# Patient Record
Sex: MALE | Race: WHITE | NOT HISPANIC OR LATINO | Employment: FULL TIME | URBAN - METROPOLITAN AREA
[De-identification: names, ages, dates, MRNs, and addresses within clinical notes are randomized per-mention and may not be internally consistent; named-entity substitution may affect disease eponyms.]

---

## 2023-04-19 DIAGNOSIS — I10 ESSENTIAL (PRIMARY) HYPERTENSION: ICD-10-CM

## 2023-04-20 RX ORDER — LOSARTAN POTASSIUM 100 MG/1
TABLET ORAL
Qty: 90 TABLET | Refills: 2 | Status: SHIPPED | OUTPATIENT
Start: 2023-04-20 | End: 2024-01-11

## 2023-05-20 DIAGNOSIS — E11.43 TYPE 2 DIABETES MELLITUS WITH DIABETIC AUTONOMIC (POLY)NEUROPATHY (MULTI): ICD-10-CM

## 2023-05-20 RX ORDER — METFORMIN HYDROCHLORIDE 500 MG/1
TABLET ORAL
Qty: 90 TABLET | Refills: 0 | Status: SHIPPED | OUTPATIENT
Start: 2023-05-20 | End: 2023-09-12 | Stop reason: SDUPTHER

## 2023-09-11 ENCOUNTER — TELEPHONE (OUTPATIENT)
Dept: PRIMARY CARE | Facility: CLINIC | Age: 53
End: 2023-09-11
Payer: COMMERCIAL

## 2023-09-11 DIAGNOSIS — E11.43 TYPE 2 DIABETES MELLITUS WITH DIABETIC AUTONOMIC (POLY)NEUROPATHY (MULTI): Primary | ICD-10-CM

## 2023-09-11 DIAGNOSIS — I10 HYPERTENSION, UNSPECIFIED TYPE: ICD-10-CM

## 2023-09-11 DIAGNOSIS — R73.09 ELEVATED GLUCOSE: ICD-10-CM

## 2023-09-11 DIAGNOSIS — Z12.5 PROSTATE CANCER SCREENING: ICD-10-CM

## 2023-09-11 DIAGNOSIS — E78.5 HYPERLIPIDEMIA, UNSPECIFIED HYPERLIPIDEMIA TYPE: ICD-10-CM

## 2023-09-11 PROBLEM — E78.00 ELEVATED SERUM CHOLESTEROL: Status: ACTIVE | Noted: 2023-09-11

## 2023-09-11 PROBLEM — M54.30 SCIATICA: Status: ACTIVE | Noted: 2023-09-11

## 2023-09-11 NOTE — TELEPHONE ENCOUNTER
Pt would like a full panel blood work order he says that you routinely check his cholesterol and sugar, and would like a call so he will know the order is in.

## 2023-09-11 NOTE — TELEPHONE ENCOUNTER
Pt needs a refill he only has one left   metFORMIN (Glucophage) 500 mg tablet 90 day supply   [id number]   Phone: [phone]

## 2023-09-12 RX ORDER — METFORMIN HYDROCHLORIDE 500 MG/1
500 TABLET ORAL DAILY
Qty: 90 TABLET | Refills: 0 | Status: SHIPPED | OUTPATIENT
Start: 2023-09-12 | End: 2023-12-11

## 2023-09-13 ENCOUNTER — LAB (OUTPATIENT)
Dept: LAB | Facility: LAB | Age: 53
End: 2023-09-13
Payer: COMMERCIAL

## 2023-09-13 DIAGNOSIS — R73.09 ELEVATED GLUCOSE: ICD-10-CM

## 2023-09-13 DIAGNOSIS — Z12.5 PROSTATE CANCER SCREENING: ICD-10-CM

## 2023-09-13 DIAGNOSIS — I10 HYPERTENSION, UNSPECIFIED TYPE: ICD-10-CM

## 2023-09-13 DIAGNOSIS — E78.5 HYPERLIPIDEMIA, UNSPECIFIED HYPERLIPIDEMIA TYPE: ICD-10-CM

## 2023-09-13 LAB
ALANINE AMINOTRANSFERASE (SGPT) (U/L) IN SER/PLAS: 32 U/L (ref 10–52)
ALBUMIN (G/DL) IN SER/PLAS: 4.6 G/DL (ref 3.4–5)
ALKALINE PHOSPHATASE (U/L) IN SER/PLAS: 46 U/L (ref 33–120)
ANION GAP IN SER/PLAS: 15 MMOL/L (ref 10–20)
ASPARTATE AMINOTRANSFERASE (SGOT) (U/L) IN SER/PLAS: 29 U/L (ref 9–39)
BASOPHILS (10*3/UL) IN BLOOD BY AUTOMATED COUNT: 0.06 X10E9/L (ref 0–0.1)
BASOPHILS/100 LEUKOCYTES IN BLOOD BY AUTOMATED COUNT: 0.9 % (ref 0–2)
BILIRUBIN TOTAL (MG/DL) IN SER/PLAS: 0.7 MG/DL (ref 0–1.2)
CALCIUM (MG/DL) IN SER/PLAS: 9.8 MG/DL (ref 8.6–10.6)
CARBON DIOXIDE, TOTAL (MMOL/L) IN SER/PLAS: 24 MMOL/L (ref 21–32)
CHLORIDE (MMOL/L) IN SER/PLAS: 104 MMOL/L (ref 98–107)
CHOLESTEROL (MG/DL) IN SER/PLAS: 208 MG/DL (ref 0–199)
CHOLESTEROL IN HDL (MG/DL) IN SER/PLAS: 40.5 MG/DL
CHOLESTEROL/HDL RATIO: 5.1
CREATININE (MG/DL) IN SER/PLAS: 1.08 MG/DL (ref 0.5–1.3)
EOSINOPHILS (10*3/UL) IN BLOOD BY AUTOMATED COUNT: 0.25 X10E9/L (ref 0–0.7)
EOSINOPHILS/100 LEUKOCYTES IN BLOOD BY AUTOMATED COUNT: 3.8 % (ref 0–6)
ERYTHROCYTE DISTRIBUTION WIDTH (RATIO) BY AUTOMATED COUNT: 12.9 % (ref 11.5–14.5)
ERYTHROCYTE MEAN CORPUSCULAR HEMOGLOBIN CONCENTRATION (G/DL) BY AUTOMATED: 32.5 G/DL (ref 32–36)
ERYTHROCYTE MEAN CORPUSCULAR VOLUME (FL) BY AUTOMATED COUNT: 95 FL (ref 80–100)
ERYTHROCYTES (10*6/UL) IN BLOOD BY AUTOMATED COUNT: 5.09 X10E12/L (ref 4.5–5.9)
ESTIMATED AVERAGE GLUCOSE FOR HBA1C: 137 MG/DL
GFR MALE: 83 ML/MIN/1.73M2
GLUCOSE (MG/DL) IN SER/PLAS: 110 MG/DL (ref 74–99)
HEMATOCRIT (%) IN BLOOD BY AUTOMATED COUNT: 48.6 % (ref 41–52)
HEMOGLOBIN (G/DL) IN BLOOD: 15.8 G/DL (ref 13.5–17.5)
HEMOGLOBIN A1C/HEMOGLOBIN TOTAL IN BLOOD: 6.4 %
IMMATURE GRANULOCYTES/100 LEUKOCYTES IN BLOOD BY AUTOMATED COUNT: 0.5 % (ref 0–0.9)
LDL: 133 MG/DL (ref 0–99)
LEUKOCYTES (10*3/UL) IN BLOOD BY AUTOMATED COUNT: 6.5 X10E9/L (ref 4.4–11.3)
LYMPHOCYTES (10*3/UL) IN BLOOD BY AUTOMATED COUNT: 1.75 X10E9/L (ref 1.2–4.8)
LYMPHOCYTES/100 LEUKOCYTES IN BLOOD BY AUTOMATED COUNT: 26.8 % (ref 13–44)
MONOCYTES (10*3/UL) IN BLOOD BY AUTOMATED COUNT: 0.83 X10E9/L (ref 0.1–1)
MONOCYTES/100 LEUKOCYTES IN BLOOD BY AUTOMATED COUNT: 12.7 % (ref 2–10)
NEUTROPHILS (10*3/UL) IN BLOOD BY AUTOMATED COUNT: 3.62 X10E9/L (ref 1.2–7.7)
NEUTROPHILS/100 LEUKOCYTES IN BLOOD BY AUTOMATED COUNT: 55.3 % (ref 40–80)
NRBC (PER 100 WBCS) BY AUTOMATED COUNT: 0 /100 WBC (ref 0–0)
PLATELETS (10*3/UL) IN BLOOD AUTOMATED COUNT: 288 X10E9/L (ref 150–450)
POTASSIUM (MMOL/L) IN SER/PLAS: 4.3 MMOL/L (ref 3.5–5.3)
PROSTATE SPECIFIC AG (NG/ML) IN SER/PLAS: 0.56 NG/ML (ref 0–4)
PROTEIN TOTAL: 6.9 G/DL (ref 6.4–8.2)
SODIUM (MMOL/L) IN SER/PLAS: 139 MMOL/L (ref 136–145)
THYROTROPIN (MIU/L) IN SER/PLAS BY DETECTION LIMIT <= 0.05 MIU/L: 1.14 MIU/L (ref 0.44–3.98)
TRIGLYCERIDE (MG/DL) IN SER/PLAS: 172 MG/DL (ref 0–149)
UREA NITROGEN (MG/DL) IN SER/PLAS: 16 MG/DL (ref 6–23)
VLDL: 34 MG/DL (ref 0–40)

## 2023-09-13 PROCEDURE — 84443 ASSAY THYROID STIM HORMONE: CPT

## 2023-09-13 PROCEDURE — 80061 LIPID PANEL: CPT

## 2023-09-13 PROCEDURE — 80053 COMPREHEN METABOLIC PANEL: CPT

## 2023-09-13 PROCEDURE — 36415 COLL VENOUS BLD VENIPUNCTURE: CPT

## 2023-09-13 PROCEDURE — 83036 HEMOGLOBIN GLYCOSYLATED A1C: CPT

## 2023-09-13 PROCEDURE — 84153 ASSAY OF PSA TOTAL: CPT

## 2023-09-13 PROCEDURE — 85025 COMPLETE CBC W/AUTO DIFF WBC: CPT

## 2023-11-20 ENCOUNTER — OFFICE VISIT (OUTPATIENT)
Dept: PRIMARY CARE | Facility: CLINIC | Age: 53
End: 2023-11-20
Payer: COMMERCIAL

## 2023-11-20 VITALS
BODY MASS INDEX: 27.92 KG/M2 | SYSTOLIC BLOOD PRESSURE: 114 MMHG | HEART RATE: 70 BPM | OXYGEN SATURATION: 97 % | TEMPERATURE: 97.5 F | HEIGHT: 70 IN | DIASTOLIC BLOOD PRESSURE: 80 MMHG | WEIGHT: 195 LBS

## 2023-11-20 DIAGNOSIS — Z00.00 ENCOUNTER FOR ROUTINE HISTORY AND PHYSICAL EXAM FOR MALE: Primary | ICD-10-CM

## 2023-11-20 DIAGNOSIS — Z23 NEED FOR VACCINATION: ICD-10-CM

## 2023-11-20 DIAGNOSIS — E78.5 HYPERLIPIDEMIA, UNSPECIFIED HYPERLIPIDEMIA TYPE: ICD-10-CM

## 2023-11-20 DIAGNOSIS — E11.9 TYPE 2 DIABETES MELLITUS WITHOUT COMPLICATION, WITHOUT LONG-TERM CURRENT USE OF INSULIN (MULTI): ICD-10-CM

## 2023-11-20 DIAGNOSIS — E78.00 ELEVATED SERUM CHOLESTEROL: ICD-10-CM

## 2023-11-20 DIAGNOSIS — I10 PRIMARY HYPERTENSION: ICD-10-CM

## 2023-11-20 DIAGNOSIS — Z12.11 COLON CANCER SCREENING: ICD-10-CM

## 2023-11-20 PROBLEM — L03.213 PERIORBITAL CELLULITIS: Status: RESOLVED | Noted: 2023-11-20 | Resolved: 2023-11-20

## 2023-11-20 PROBLEM — L03.213 PERIORBITAL CELLULITIS: Status: ACTIVE | Noted: 2023-11-20

## 2023-11-20 PROBLEM — N48.9 PENILE LESION: Status: RESOLVED | Noted: 2023-11-20 | Resolved: 2023-11-20

## 2023-11-20 PROBLEM — M54.30 SCIATICA: Status: RESOLVED | Noted: 2023-09-11 | Resolved: 2023-11-20

## 2023-11-20 PROBLEM — N48.9 PENILE LESION: Status: ACTIVE | Noted: 2023-11-20

## 2023-11-20 PROCEDURE — 3079F DIAST BP 80-89 MM HG: CPT | Performed by: FAMILY MEDICINE

## 2023-11-20 PROCEDURE — 3044F HG A1C LEVEL LT 7.0%: CPT | Performed by: FAMILY MEDICINE

## 2023-11-20 PROCEDURE — 93000 ELECTROCARDIOGRAM COMPLETE: CPT | Performed by: FAMILY MEDICINE

## 2023-11-20 PROCEDURE — 1036F TOBACCO NON-USER: CPT | Performed by: FAMILY MEDICINE

## 2023-11-20 PROCEDURE — 3074F SYST BP LT 130 MM HG: CPT | Performed by: FAMILY MEDICINE

## 2023-11-20 PROCEDURE — 99396 PREV VISIT EST AGE 40-64: CPT | Performed by: FAMILY MEDICINE

## 2023-11-20 PROCEDURE — 4010F ACE/ARB THERAPY RXD/TAKEN: CPT | Performed by: FAMILY MEDICINE

## 2023-11-20 RX ORDER — PRAVASTATIN SODIUM 10 MG/1
10 TABLET ORAL DAILY
Qty: 30 TABLET | Refills: 5 | Status: SHIPPED | OUTPATIENT
Start: 2023-11-20 | End: 2024-04-05 | Stop reason: ALTCHOICE

## 2023-11-20 ASSESSMENT — ENCOUNTER SYMPTOMS
APPETITE CHANGE: 0
NUMBNESS: 0
OCCASIONAL FEELINGS OF UNSTEADINESS: 0
FACIAL ASYMMETRY: 0
EYE ITCHING: 0
DIZZINESS: 0
DYSPHORIC MOOD: 0
LIGHT-HEADEDNESS: 0
TROUBLE SWALLOWING: 0
DIARRHEA: 0
ADENOPATHY: 0
PALPITATIONS: 0
DIFFICULTY URINATING: 0
EYES NEGATIVE: 1
VOMITING: 0
DEPRESSION: 0
ABDOMINAL DISTENTION: 0
HEMATURIA: 0
HALLUCINATIONS: 0
RESPIRATORY NEGATIVE: 1
SINUS PAIN: 0
CONSTIPATION: 0
VOICE CHANGE: 0
FATIGUE: 0
CHEST TIGHTNESS: 0
MYALGIAS: 0
FREQUENCY: 0
ANAL BLEEDING: 0
ABDOMINAL PAIN: 0
GASTROINTESTINAL NEGATIVE: 1
SPEECH DIFFICULTY: 0
FLANK PAIN: 0
DECREASED CONCENTRATION: 0
POLYDIPSIA: 0
ARTHRALGIAS: 0
DYSURIA: 0
AGITATION: 0
BRUISES/BLEEDS EASILY: 0
DIAPHORESIS: 0
CARDIOVASCULAR NEGATIVE: 1
SEIZURES: 0
NAUSEA: 0
BLOOD IN STOOL: 0
EYE DISCHARGE: 0
BACK PAIN: 0
NERVOUS/ANXIOUS: 0
SINUS PRESSURE: 0
WOUND: 0
SORE THROAT: 0
LOSS OF SENSATION IN FEET: 0
CHILLS: 0
ACTIVITY CHANGE: 0
SLEEP DISTURBANCE: 0
COUGH: 0
UNEXPECTED WEIGHT CHANGE: 0
EYE PAIN: 0
EYE REDNESS: 0
HEADACHES: 0

## 2023-11-20 ASSESSMENT — COLUMBIA-SUICIDE SEVERITY RATING SCALE - C-SSRS
1. IN THE PAST MONTH, HAVE YOU WISHED YOU WERE DEAD OR WISHED YOU COULD GO TO SLEEP AND NOT WAKE UP?: NO
6. HAVE YOU EVER DONE ANYTHING, STARTED TO DO ANYTHING, OR PREPARED TO DO ANYTHING TO END YOUR LIFE?: NO
2. HAVE YOU ACTUALLY HAD ANY THOUGHTS OF KILLING YOURSELF?: NO

## 2023-11-20 ASSESSMENT — PATIENT HEALTH QUESTIONNAIRE - PHQ9
2. FEELING DOWN, DEPRESSED OR HOPELESS: NOT AT ALL
1. LITTLE INTEREST OR PLEASURE IN DOING THINGS: NOT AT ALL
SUM OF ALL RESPONSES TO PHQ9 QUESTIONS 1 AND 2: 0
10. IF YOU CHECKED OFF ANY PROBLEMS, HOW DIFFICULT HAVE THESE PROBLEMS MADE IT FOR YOU TO DO YOUR WORK, TAKE CARE OF THINGS AT HOME, OR GET ALONG WITH OTHER PEOPLE: NOT DIFFICULT AT ALL

## 2023-11-20 NOTE — PROGRESS NOTES
Subjective   Patient ID: Sherwin Saldana is a 53 y.o. male who presents for Annual Exam.    Patient overall doing well.  He has had no troubles with headache no double vision or blurry vision.    Patient needs to have his colon cancer screening performed.    Patient had taken rosuvastatin in the past and had muscle aches and discomfort with it.    Discussion about starting a new medicine we are going to try this.    Patient had no troubles with headache or double vision or blurring vision no troubles with sore throat or difficulty swallowing no troubles with abdominal pain or discomfort no troubles with swelling of the legs or feet.  No fever no chills no night sweats.         Alcohol intake: none  Caffeine intake: 2 cups a day  Exercise: hiking     Last Colonoscopy: ordered  Last Pap smear: N/A  Mammogram:N/A  Last Dexa scan:N/A    Shingles vaccine: recommended  TdaP vaccine:     Review of Systems   Constitutional:  Negative for activity change, appetite change, chills, diaphoresis, fatigue and unexpected weight change.   HENT: Negative.  Negative for congestion, dental problem, ear discharge, ear pain, hearing loss, mouth sores, nosebleeds, postnasal drip, sinus pressure, sinus pain, sore throat, tinnitus, trouble swallowing and voice change.    Eyes: Negative.  Negative for pain, discharge, redness, itching and visual disturbance.   Respiratory: Negative.  Negative for cough and chest tightness.    Cardiovascular: Negative.  Negative for chest pain, palpitations and leg swelling.   Gastrointestinal: Negative.  Negative for abdominal distention, abdominal pain, anal bleeding, blood in stool, constipation, diarrhea, nausea and vomiting.   Endocrine: Negative for cold intolerance, heat intolerance, polydipsia and polyuria.   Genitourinary:  Negative for difficulty urinating, dysuria, enuresis, flank pain, frequency, genital sores and hematuria.   Musculoskeletal:  Negative for arthralgias, back pain and myalgias.  "  Skin: Negative.  Negative for rash and wound.   Allergic/Immunologic: Negative for environmental allergies, food allergies and immunocompromised state.   Neurological:  Negative for dizziness, seizures, facial asymmetry, speech difficulty, light-headedness, numbness and headaches.   Hematological:  Negative for adenopathy. Does not bruise/bleed easily.   Psychiatric/Behavioral:  Negative for agitation, behavioral problems, decreased concentration, dysphoric mood, hallucinations, sleep disturbance and suicidal ideas. The patient is not nervous/anxious.        Objective   /80   Pulse 70   Temp 36.4 °C (97.5 °F)   Ht 1.778 m (5' 10\")   Wt 88.5 kg (195 lb)   SpO2 97%   BMI 27.98 kg/m²   BSA Body surface area is 2.09 meters squared.      Physical Exam  Constitutional:       General: He is not in acute distress.     Appearance: Normal appearance. He is not ill-appearing or toxic-appearing.   HENT:      Head: Normocephalic.      Right Ear: Tympanic membrane normal.      Left Ear: Tympanic membrane normal.      Nose: Nose normal.   Eyes:      Extraocular Movements: Extraocular movements intact.      Conjunctiva/sclera: Conjunctivae normal.      Pupils: Pupils are equal, round, and reactive to light.   Cardiovascular:      Rate and Rhythm: Normal rate and regular rhythm.      Pulses: Normal pulses.      Heart sounds: Normal heart sounds.   Pulmonary:      Effort: Pulmonary effort is normal.      Breath sounds: Normal breath sounds. No wheezing or rhonchi.   Abdominal:      General: Abdomen is flat. Bowel sounds are normal. There is no distension.      Palpations: Abdomen is soft.      Tenderness: There is no abdominal tenderness. There is no right CVA tenderness or rebound.      Hernia: No hernia is present.   Genitourinary:     Penis: Normal.       Testes: Normal.      Prostate: Normal.      Rectum: Normal.   Musculoskeletal:         General: Normal range of motion.      Cervical back: Normal range of motion. "      Right lower leg: No edema.   Skin:     General: Skin is warm and dry.      Capillary Refill: Capillary refill takes less than 2 seconds.      Findings: No bruising.   Neurological:      General: No focal deficit present.      Mental Status: He is alert and oriented to person, place, and time.      Cranial Nerves: No cranial nerve deficit.      Motor: No weakness.      Coordination: Coordination normal.      Gait: Gait normal.      Deep Tendon Reflexes: Reflexes normal.   Psychiatric:         Mood and Affect: Mood normal.         Behavior: Behavior normal.         Thought Content: Thought content normal.     Feet revealed no calluses.    Monofilament testing is intact.  Lab on 09/13/2023   Component Date Value Ref Range Status    WBC 09/13/2023 6.5  4.4 - 11.3 x10E9/L Final    nRBC 09/13/2023 0.0  0.0 - 0.0 /100 WBC Final    RBC 09/13/2023 5.09  4.50 - 5.90 x10E12/L Final    Hemoglobin 09/13/2023 15.8  13.5 - 17.5 g/dL Final    Hematocrit 09/13/2023 48.6  41.0 - 52.0 % Final    MCV 09/13/2023 95  80 - 100 fL Final    MCHC 09/13/2023 32.5  32.0 - 36.0 g/dL Final    Platelets 09/13/2023 288  150 - 450 x10E9/L Final    RDW 09/13/2023 12.9  11.5 - 14.5 % Final    Neutrophils % 09/13/2023 55.3  40.0 - 80.0 % Final    Immature Granulocytes %, Automated 09/13/2023 0.5  0.0 - 0.9 % Final     Immature Granulocyte Count (IG) includes promyelocytes,    myelocytes and metamyelocytes but does not include bands.   Percent differential counts (%) should be interpreted in the   context of the absolute cell counts (cells/L).    Lymphocytes % 09/13/2023 26.8  13.0 - 44.0 % Final    Monocytes % 09/13/2023 12.7  2.0 - 10.0 % Final    Eosinophils % 09/13/2023 3.8  0.0 - 6.0 % Final    Basophils % 09/13/2023 0.9  0.0 - 2.0 % Final    Neutrophils Absolute 09/13/2023 3.62  1.20 - 7.70 x10E9/L Final    Lymphocytes Absolute 09/13/2023 1.75  1.20 - 4.80 x10E9/L Final    Monocytes Absolute 09/13/2023 0.83  0.10 - 1.00 x10E9/L Final     Eosinophils Absolute 09/13/2023 0.25  0.00 - 0.70 x10E9/L Final    Basophils Absolute 09/13/2023 0.06  0.00 - 0.10 x10E9/L Final    Glucose 09/13/2023 110 (H)  74 - 99 mg/dL Final    Sodium 09/13/2023 139  136 - 145 mmol/L Final    Potassium 09/13/2023 4.3  3.5 - 5.3 mmol/L Final    Chloride 09/13/2023 104  98 - 107 mmol/L Final    Bicarbonate 09/13/2023 24  21 - 32 mmol/L Final    Anion Gap 09/13/2023 15  10 - 20 mmol/L Final    Urea Nitrogen 09/13/2023 16  6 - 23 mg/dL Final    Creatinine 09/13/2023 1.08  0.50 - 1.30 mg/dL Final    GFR MALE 09/13/2023 83  >90 mL/min/1.73m2 Final     CALCULATIONS OF ESTIMATED GFR ARE PERFORMED   USING THE 2021 CKD-EPI STUDY REFIT EQUATION   WITHOUT THE RACE VARIABLE FOR THE IDMS-TRACEABLE   CREATININE METHODS.    https://jasn.asnjournals.org/content/early/2021/09/22/ASN.4341154054    Calcium 09/13/2023 9.8  8.6 - 10.6 mg/dL Final    Albumin 09/13/2023 4.6  3.4 - 5.0 g/dL Final    Alkaline Phosphatase 09/13/2023 46  33 - 120 U/L Final    Total Protein 09/13/2023 6.9  6.4 - 8.2 g/dL Final    AST 09/13/2023 29  9 - 39 U/L Final    Total Bilirubin 09/13/2023 0.7  0.0 - 1.2 mg/dL Final    ALT (SGPT) 09/13/2023 32  10 - 52 U/L Final     Patients treated with Sulfasalazine may generate    falsely decreased results for ALT.    TSH 09/13/2023 1.14  0.44 - 3.98 mIU/L Final     TSH testing is performed using different testing    methodology at [facility] than at other    [facility] hospitals. Direct result comparisons should    only be made within the same method.    Cholesterol 09/13/2023 208 (H)  0 - 199 mg/dL Final    .      AGE      DESIRABLE   BORDERLINE HIGH   HIGH     0-19 Y     0 - 169       170 - 199     >/= 200    20-24 Y     0 - 189       190 - 224     >/= 225         >24 Y     0 - 199       200 - 239     >/= 240   **All ranges are based on fasting samples. Specific   therapeutic targets will vary based on patient-specific   cardiac risk.  .   Pediatric guidelines  reference:Pediatrics 2011, 128(S5).   Adult guidelines reference: NCEP ATPIII Guidelines,     KURT 2001, 258:2486-04  .   Venipuncture immediately after or during the    administration of Metamizole may lead to falsely   low results. Testing should be performed immediately   prior to Metamizole dosing.    HDL 09/13/2023 40.5  mg/dL Final    .      AGE      VERY LOW   LOW     NORMAL    HIGH       0-19 Y       < 35   < 40     40-45     ----    20-24 Y       ----   < 40       >45     ----      >24 Y       ----   < 40     40-60      >60  .    Cholesterol/HDL Ratio 09/13/2023 5.1 (A)   Final    REF VALUES  DESIRABLE  < 3.4  HIGH RISK  > 5.0    LDL 09/13/2023 133 (H)  0 - 99 mg/dL Final    .                           NEAR      BORD      AGE      DESIRABLE  OPTIMAL    HIGH     HIGH     VERY HIGH     0-19 Y     0 - 109     ---    110-129   >/= 130     ----    20-24 Y     0 - 119     ---    120-159   >/= 160     ----      >24 Y     0 -  99   100-129  130-159   160-189     >/=190  .    VLDL 09/13/2023 34  0 - 40 mg/dL Final    Triglycerides 09/13/2023 172 (H)  0 - 149 mg/dL Final    .      AGE      DESIRABLE   BORDERLINE HIGH   HIGH     VERY HIGH   0 D-90 D    19 - 174         ----         ----        ----  91 D- 9 Y     0 -  74        75 -  99     >/= 100      ----    10-19 Y     0 -  89        90 - 129     >/= 130      ----    20-24 Y     0 - 114       115 - 149     >/= 150      ----         >24 Y     0 - 149       150 - 199    200- 499    >/= 500  .   Venipuncture immediately after or during the    administration of Metamizole may lead to falsely   low results. Testing should be performed immediately   prior to Metamizole dosing.    PSA 09/13/2023 0.56  0.00 - 4.00 ng/mL Final    The FDA requires that the method used for PSA assay be   reported to the physician. Values obtained with different   assay methods must not be used interchangeably. This test   was performed at [facility] using the  [employer] PSA method, which is a sandwich immunoassay using   chemiluminescence for quantitation. The assay is approved  for measurement of prostate-specific antigen (PSA) in   serum and may be used in conjunction with a digital rectal  examination in men 50 years and older as an aid in   detection of prostate cancer.   5-Alpha-reductase inhibitors (e.g. Proscar, Finasteride,   Avodart, Dutasteride and Gaviota) for the treatment of BPH   have been shown to lower PSA levels by an average of 50%   after 6 months of treatment.    Hemoglobin A1C 09/13/2023 6.4 (A)  % Final         Diagnosis of Diabetes-Adults   Non-Diabetic: < or = 5.6%   Increased risk for developing diabetes: 5.7-6.4%   Diagnostic of diabetes: > or = 6.5%  .       Monitoring of Diabetes                Age (y)     Therapeutic Goal (%)   Adults:          >18           <7.0   Pediatrics:    13-18           <7.5                   7-12           <8.0                   0- 6            7.5-8.5   American Diabetes Association. Diabetes Care 33(S1), Jan 2010.    Estimated Average Glucose 09/13/2023 137  MG/DL Final     Current Outpatient Medications on File Prior to Visit   Medication Sig Dispense Refill    losartan (Cozaar) 100 mg tablet TAKE 1 TABLET BY MOUTH EVERY DAY 90 tablet 2    metFORMIN (Glucophage) 500 mg tablet Take 1 tablet (500 mg) by mouth once daily. 90 tablet 0     No current facility-administered medications on file prior to visit.     No images are attached to the encounter.            Assessment/Plan   Problem List Items Addressed This Visit             ICD-10-CM    Elevated serum cholesterol E78.00     Cholesterol level is elevated.  Going to have pravastatin started.    Please take 1 daily.         Hyperlipemia E78.5     Cholesterol level not at goal.         Relevant Medications    pravastatin (Pravachol) 10 mg tablet    Hypertension I10    Encounter for routine history and physical exam for male - Primary Z00.00    Need for  vaccination Z23    Relevant Orders    Flu vaccine (IIV4) age 6 months and greater, preservative free    Type 2 diabetes mellitus without complication, without long-term current use of insulin (CMS/[facility]) E11.9     Hemoglobin A1c is at goal.  On metformin

## 2023-11-20 NOTE — PATIENT INSTRUCTIONS
Please continue to exercise 40 minutes 4 times weekly.    Please continue to follow dietary guidelines closely.    Trying Pravachol therapy to try to help lower the cholesterol levels.  If muscle aches or discomfort noted please let me know.    EKG performed and reviewed.    Urinalysis and micral are being performed.    Recommend having colon cancer screening performed.  If troubles making appointment, please call and let me know    We are checking lipids in 4 weeks

## 2023-12-09 DIAGNOSIS — E11.43 TYPE 2 DIABETES MELLITUS WITH DIABETIC AUTONOMIC (POLY)NEUROPATHY (MULTI): ICD-10-CM

## 2023-12-11 RX ORDER — METFORMIN HYDROCHLORIDE 500 MG/1
500 TABLET ORAL DAILY
Qty: 90 TABLET | Refills: 1 | Status: SHIPPED | OUTPATIENT
Start: 2023-12-11 | End: 2024-06-04

## 2023-12-15 ENCOUNTER — LAB (OUTPATIENT)
Dept: LAB | Facility: LAB | Age: 53
End: 2023-12-15
Payer: COMMERCIAL

## 2023-12-15 DIAGNOSIS — E11.9 TYPE 2 DIABETES MELLITUS WITHOUT COMPLICATION, WITHOUT LONG-TERM CURRENT USE OF INSULIN (MULTI): ICD-10-CM

## 2023-12-15 DIAGNOSIS — E78.00 ELEVATED SERUM CHOLESTEROL: ICD-10-CM

## 2023-12-15 DIAGNOSIS — E78.5 HYPERLIPIDEMIA, UNSPECIFIED HYPERLIPIDEMIA TYPE: ICD-10-CM

## 2023-12-15 DIAGNOSIS — I10 PRIMARY HYPERTENSION: ICD-10-CM

## 2023-12-15 LAB
ALBUMIN SERPL BCP-MCNC: 4.9 G/DL (ref 3.4–5)
ALP SERPL-CCNC: 43 U/L (ref 33–120)
ALT SERPL W P-5'-P-CCNC: 27 U/L (ref 10–52)
ANION GAP SERPL CALC-SCNC: 15 MMOL/L (ref 10–20)
APPEARANCE UR: CLEAR
AST SERPL W P-5'-P-CCNC: 30 U/L (ref 9–39)
BILIRUB SERPL-MCNC: 0.7 MG/DL (ref 0–1.2)
BILIRUB UR STRIP.AUTO-MCNC: NEGATIVE MG/DL
BUN SERPL-MCNC: 14 MG/DL (ref 6–23)
CALCIUM SERPL-MCNC: 10.2 MG/DL (ref 8.6–10.6)
CHLORIDE SERPL-SCNC: 103 MMOL/L (ref 98–107)
CHOLEST SERPL-MCNC: 196 MG/DL (ref 0–199)
CHOLESTEROL/HDL RATIO: 4.2
CK SERPL-CCNC: 31 U/L (ref 0–325)
CO2 SERPL-SCNC: 28 MMOL/L (ref 21–32)
COLOR UR: YELLOW
CREAT SERPL-MCNC: 1.02 MG/DL (ref 0.5–1.3)
CREAT UR-MCNC: 122.2 MG/DL (ref 20–370)
CRP SERPL-MCNC: 0.2 MG/DL
ERYTHROCYTE [SEDIMENTATION RATE] IN BLOOD BY WESTERGREN METHOD: 2 MM/H (ref 0–20)
GFR SERPL CREATININE-BSD FRML MDRD: 89 ML/MIN/1.73M*2
GLUCOSE SERPL-MCNC: 107 MG/DL (ref 74–99)
GLUCOSE UR STRIP.AUTO-MCNC: NEGATIVE MG/DL
HDLC SERPL-MCNC: 46.3 MG/DL
KETONES UR STRIP.AUTO-MCNC: NEGATIVE MG/DL
LDLC SERPL CALC-MCNC: 125 MG/DL
LEUKOCYTE ESTERASE UR QL STRIP.AUTO: NEGATIVE
MICROALBUMIN UR-MCNC: 7.1 MG/L
MICROALBUMIN/CREAT UR: 5.8 UG/MG CREAT
NITRITE UR QL STRIP.AUTO: NEGATIVE
NON HDL CHOLESTEROL: 150 MG/DL (ref 0–149)
PH UR STRIP.AUTO: 5 [PH]
POTASSIUM SERPL-SCNC: 4.7 MMOL/L (ref 3.5–5.3)
PROT SERPL-MCNC: 7.4 G/DL (ref 6.4–8.2)
PROT UR STRIP.AUTO-MCNC: NEGATIVE MG/DL
RBC # UR STRIP.AUTO: NEGATIVE /UL
SODIUM SERPL-SCNC: 141 MMOL/L (ref 136–145)
SP GR UR STRIP.AUTO: 1.01
TRIGL SERPL-MCNC: 123 MG/DL (ref 0–149)
UROBILINOGEN UR STRIP.AUTO-MCNC: <2 MG/DL
VLDL: 25 MG/DL (ref 0–40)

## 2023-12-15 PROCEDURE — 80053 COMPREHEN METABOLIC PANEL: CPT

## 2023-12-15 PROCEDURE — 36415 COLL VENOUS BLD VENIPUNCTURE: CPT

## 2023-12-15 PROCEDURE — 85652 RBC SED RATE AUTOMATED: CPT

## 2023-12-15 PROCEDURE — 81003 URINALYSIS AUTO W/O SCOPE: CPT

## 2023-12-15 PROCEDURE — 82570 ASSAY OF URINE CREATININE: CPT

## 2023-12-15 PROCEDURE — 82043 UR ALBUMIN QUANTITATIVE: CPT

## 2023-12-15 PROCEDURE — 86140 C-REACTIVE PROTEIN: CPT

## 2023-12-15 PROCEDURE — 82550 ASSAY OF CK (CPK): CPT

## 2023-12-15 PROCEDURE — 80061 LIPID PANEL: CPT

## 2024-01-11 DIAGNOSIS — I10 ESSENTIAL (PRIMARY) HYPERTENSION: ICD-10-CM

## 2024-01-11 RX ORDER — LOSARTAN POTASSIUM 100 MG/1
TABLET ORAL
Qty: 90 TABLET | Refills: 2 | Status: SHIPPED | OUTPATIENT
Start: 2024-01-11

## 2024-02-15 ENCOUNTER — OFFICE VISIT (OUTPATIENT)
Dept: PRIMARY CARE | Facility: CLINIC | Age: 54
End: 2024-02-15
Payer: COMMERCIAL

## 2024-02-15 ENCOUNTER — LAB (OUTPATIENT)
Dept: LAB | Facility: LAB | Age: 54
End: 2024-02-15
Payer: COMMERCIAL

## 2024-02-15 VITALS
HEART RATE: 103 BPM | WEIGHT: 197 LBS | RESPIRATION RATE: 16 BRPM | SYSTOLIC BLOOD PRESSURE: 127 MMHG | TEMPERATURE: 97.6 F | OXYGEN SATURATION: 98 % | DIASTOLIC BLOOD PRESSURE: 90 MMHG | BODY MASS INDEX: 28.27 KG/M2

## 2024-02-15 DIAGNOSIS — G50.0 TRIGEMINAL NEURALGIA: ICD-10-CM

## 2024-02-15 DIAGNOSIS — E11.9 TYPE 2 DIABETES MELLITUS WITHOUT COMPLICATION, WITHOUT LONG-TERM CURRENT USE OF INSULIN (MULTI): ICD-10-CM

## 2024-02-15 DIAGNOSIS — G50.0 TRIGEMINAL NEURALGIA: Primary | ICD-10-CM

## 2024-02-15 LAB
ALBUMIN SERPL BCP-MCNC: 4.8 G/DL (ref 3.4–5)
ALP SERPL-CCNC: 46 U/L (ref 33–120)
ALT SERPL W P-5'-P-CCNC: 30 U/L (ref 10–52)
ANION GAP SERPL CALC-SCNC: 18 MMOL/L (ref 10–20)
AST SERPL W P-5'-P-CCNC: 26 U/L (ref 9–39)
BILIRUB SERPL-MCNC: 0.5 MG/DL (ref 0–1.2)
BUN SERPL-MCNC: 12 MG/DL (ref 6–23)
CALCIUM SERPL-MCNC: 10.2 MG/DL (ref 8.6–10.6)
CHLORIDE SERPL-SCNC: 102 MMOL/L (ref 98–107)
CHOLEST SERPL-MCNC: 261 MG/DL (ref 0–199)
CHOLESTEROL/HDL RATIO: 5.5
CO2 SERPL-SCNC: 27 MMOL/L (ref 21–32)
CREAT SERPL-MCNC: 0.9 MG/DL (ref 0.5–1.3)
CRP SERPL-MCNC: <0.1 MG/DL
EGFRCR SERPLBLD CKD-EPI 2021: >90 ML/MIN/1.73M*2
ERYTHROCYTE [SEDIMENTATION RATE] IN BLOOD BY WESTERGREN METHOD: 3 MM/H (ref 0–20)
EST. AVERAGE GLUCOSE BLD GHB EST-MCNC: 143 MG/DL
GLUCOSE SERPL-MCNC: 139 MG/DL (ref 74–99)
HBA1C MFR BLD: 6.6 %
HDLC SERPL-MCNC: 47.3 MG/DL
LDLC SERPL CALC-MCNC: 177 MG/DL
NON HDL CHOLESTEROL: 214 MG/DL (ref 0–149)
POTASSIUM SERPL-SCNC: 4.6 MMOL/L (ref 3.5–5.3)
PROT SERPL-MCNC: 7.1 G/DL (ref 6.4–8.2)
SODIUM SERPL-SCNC: 142 MMOL/L (ref 136–145)
TRIGL SERPL-MCNC: 184 MG/DL (ref 0–149)
VLDL: 37 MG/DL (ref 0–40)

## 2024-02-15 PROCEDURE — 1036F TOBACCO NON-USER: CPT | Performed by: FAMILY MEDICINE

## 2024-02-15 PROCEDURE — 83036 HEMOGLOBIN GLYCOSYLATED A1C: CPT

## 2024-02-15 PROCEDURE — 4010F ACE/ARB THERAPY RXD/TAKEN: CPT | Performed by: FAMILY MEDICINE

## 2024-02-15 PROCEDURE — 3080F DIAST BP >= 90 MM HG: CPT | Performed by: FAMILY MEDICINE

## 2024-02-15 PROCEDURE — 80053 COMPREHEN METABOLIC PANEL: CPT

## 2024-02-15 PROCEDURE — 3074F SYST BP LT 130 MM HG: CPT | Performed by: FAMILY MEDICINE

## 2024-02-15 PROCEDURE — 36415 COLL VENOUS BLD VENIPUNCTURE: CPT

## 2024-02-15 PROCEDURE — 86140 C-REACTIVE PROTEIN: CPT

## 2024-02-15 PROCEDURE — 80061 LIPID PANEL: CPT

## 2024-02-15 PROCEDURE — 99213 OFFICE O/P EST LOW 20 MIN: CPT | Performed by: FAMILY MEDICINE

## 2024-02-15 PROCEDURE — 85652 RBC SED RATE AUTOMATED: CPT

## 2024-02-15 RX ORDER — GABAPENTIN 100 MG/1
100 CAPSULE ORAL 3 TIMES DAILY
Qty: 90 CAPSULE | Refills: 0 | Status: SHIPPED | OUTPATIENT
Start: 2024-02-15 | End: 2024-04-05 | Stop reason: ALTCHOICE

## 2024-02-15 ASSESSMENT — ENCOUNTER SYMPTOMS
CARDIOVASCULAR NEGATIVE: 1
FACIAL ASYMMETRY: 0
RESPIRATORY NEGATIVE: 1
HEADACHES: 0
LOSS OF SENSATION IN FEET: 0
NUMBNESS: 0
OCCASIONAL FEELINGS OF UNSTEADINESS: 0
DIZZINESS: 0
DEPRESSION: 0
CONSTITUTIONAL NEGATIVE: 1
PAIN: 1
SPEECH DIFFICULTY: 0

## 2024-02-15 ASSESSMENT — PATIENT HEALTH QUESTIONNAIRE - PHQ9
1. LITTLE INTEREST OR PLEASURE IN DOING THINGS: NOT AT ALL
10. IF YOU CHECKED OFF ANY PROBLEMS, HOW DIFFICULT HAVE THESE PROBLEMS MADE IT FOR YOU TO DO YOUR WORK, TAKE CARE OF THINGS AT HOME, OR GET ALONG WITH OTHER PEOPLE: NOT DIFFICULT AT ALL
2. FEELING DOWN, DEPRESSED OR HOPELESS: NOT AT ALL
SUM OF ALL RESPONSES TO PHQ9 QUESTIONS 1 AND 2: 0

## 2024-02-15 NOTE — ASSESSMENT & PLAN NOTE
The evaluating trigeminal neuralgia.    Starting Neurontin therapy.    MRI of the brain with and without contrast being performed.  Checking labs including CMP's and lipids hemoglobin A1c sed rate and CRP

## 2024-02-15 NOTE — PROGRESS NOTES
Subjective   Patient ID: Sherwin Saldana is a 53 y.o. male who presents for Pain (Nerve pain ; right hand side of face and jaw).    Clean dentist.  Sharp lancing pain.  Patient over the last couple of months has been getting sharp lancing pain in the right side of the face going into the right jaw area to the tip of the jaw.  He had no trauma he had no rash no evidence of zoster.  Is not painful with eating but sometimes he can feel it was having something that is hot or something that is cold.    Patient tried ibuprofen and Tylenol it does not make a difference.  Has had extensive workup by his dentist and also with endodontist there is no dental abnormalities have been noted.    Patient states that over the last weekend it was about a 8 out of 10 with pain.  Right now to do 0 it would just come and go it may last for hours    Doing yoga. 0 pain.    Pain  Pertinent negatives include no headaches.        Review of Systems   Constitutional: Negative.    HENT: Negative.     Respiratory: Negative.     Cardiovascular: Negative.    Neurological:  Negative for dizziness, facial asymmetry, speech difficulty, numbness and headaches.   As noted in HPI    Objective   /90 (BP Location: Right arm, Patient Position: Sitting, BP Cuff Size: Adult)   Pulse 103   Temp 36.4 °C (97.6 °F)   Resp 16   Wt 89.4 kg (197 lb)   SpO2 98%   BMI 28.27 kg/m²   BSA Body surface area is 2.1 meters squared.      Physical Exam  Constitutional:       Appearance: Normal appearance.   HENT:      Head: Normocephalic.      Right Ear: Tympanic membrane normal.      Left Ear: Tympanic membrane normal.      Nose: Nose normal.      Mouth/Throat:      Mouth: Mucous membranes are dry.   Eyes:      Extraocular Movements: Extraocular movements intact.      Pupils: Pupils are equal, round, and reactive to light.   Cardiovascular:      Rate and Rhythm: Normal rate and regular rhythm.      Pulses: Normal pulses.   Pulmonary:      Effort: Pulmonary effort  is normal.      Breath sounds: Normal breath sounds.   Abdominal:      General: Abdomen is flat.      Palpations: Abdomen is soft.   Musculoskeletal:      Cervical back: Normal range of motion.   Skin:     General: Skin is warm.   Neurological:      General: No focal deficit present.      Mental Status: He is alert and oriented to person, place, and time.     Cranial nerves II through XII are intact.    Dental exam is unremarkable.    No lesions noted on the inside of mouth there is no rash or evidence of zoster cervical spine reveals good range of motion no troubles with opening and closing the jaw  Lab on 12/15/2023   Component Date Value Ref Range Status    Glucose 12/15/2023 107 (H)  74 - 99 mg/dL Final    Sodium 12/15/2023 141  136 - 145 mmol/L Final    Potassium 12/15/2023 4.7  3.5 - 5.3 mmol/L Final    Chloride 12/15/2023 103  98 - 107 mmol/L Final    Bicarbonate 12/15/2023 28  21 - 32 mmol/L Final    Anion Gap 12/15/2023 15  10 - 20 mmol/L Final    Urea Nitrogen 12/15/2023 14  6 - 23 mg/dL Final    Creatinine 12/15/2023 1.02  0.50 - 1.30 mg/dL Final    eGFR 12/15/2023 89  >60 mL/min/1.73m*2 Final    Calculations of estimated GFR are performed using the 2021 CKD-EPI Study Refit equation without the race variable for the IDMS-Traceable creatinine methods.  https://jasn.asnjournals.org/content/early/2021/09/22/ASN.1863871005    Calcium 12/15/2023 10.2  8.6 - 10.6 mg/dL Final    Albumin 12/15/2023 4.9  3.4 - 5.0 g/dL Final    Alkaline Phosphatase 12/15/2023 43  33 - 120 U/L Final    Total Protein 12/15/2023 7.4  6.4 - 8.2 g/dL Final    AST 12/15/2023 30  9 - 39 U/L Final    Bilirubin, Total 12/15/2023 0.7  0.0 - 1.2 mg/dL Final    ALT 12/15/2023 27  10 - 52 U/L Final    Patients treated with Sulfasalazine may generate falsely decreased results for ALT.    Cholesterol 12/15/2023 196  0 - 199 mg/dL Final          Age      Desirable   Borderline High   High     0-19 Y     0 - 169       170 - 199     >/= 200     20-24 Y     0 - 189       190 - 224     >/= 225         >24 Y     0 - 199       200 - 239     >/= 240   **All ranges are based on fasting samples. Specific   therapeutic targets will vary based on patient-specific   cardiac risk.    Pediatric guidelines reference:Pediatrics 2011, 128(S5).Adult guidelines reference: NCEP ATPIII Guidelines,KURT 2001, 258:2486-97    Venipuncture immediately after or during the administration of Metamizole may lead to falsely low results. Testing should be performed immediately prior to Metamizole dosing.    HDL-Cholesterol 12/15/2023 46.3  mg/dL Final      Age       Very Low   Low     Normal    High    0-19 Y    < 35      < 40     40-45     ----  20-24 Y    ----     < 40      >45      ----        >24 Y      ----     < 40     40-60      >60      Cholesterol/HDL Ratio 12/15/2023 4.2   Final      Ref Values  Desirable  < 3.4  High Risk  > 5.0    LDL Calculated 12/15/2023 125 (H)  <=99 mg/dL Final                                Near   Borderline      AGE      Desirable  Optimal    High     High     Very High     0-19 Y     0 - 109     ---    110-129   >/= 130     ----    20-24 Y     0 - 119     ---    120-159   >/= 160     ----      >24 Y     0 -  99   100-129  130-159   160-189     >/=190      VLDL 12/15/2023 25  0 - 40 mg/dL Final    Triglycerides 12/15/2023 123  0 - 149 mg/dL Final       Age         Desirable   Borderline High   High     Very High   0 D-90 D    19 - 174         ----         ----        ----  91 D- 9 Y     0 -  74        75 -  99     >/= 100      ----    10-19 Y     0 -  89        90 - 129     >/= 130      ----    20-24 Y     0 - 114       115 - 149     >/= 150      ----         >24 Y     0 - 149       150 - 199    200- 499    >/= 500    Venipuncture immediately after or during the administration of Metamizole may lead to falsely low results. Testing should be performed immediately prior to Metamizole dosing.    Non HDL Cholesterol 12/15/2023 150 (H)  0 - 149 mg/dL Final           Age       Desirable   Borderline High   High     Very High     0-19 Y     0 - 119       120 - 144     >/= 145    >/= 160    20-24 Y     0 - 149       150 - 189     >/= 190      ----         >24 Y    30 mg/dL above LDL Cholesterol goal      Creatine Kinase 12/15/2023 31  0 - 325 U/L Final    C-Reactive Protein 12/15/2023 0.20  <1.00 mg/dL Final    Sedimentation Rate 12/15/2023 2  0 - 20 mm/h Final    Albumin, Urine Random 12/15/2023 7.1  Not established mg/L Final    Creatinine, Urine Random 12/15/2023 122.2  20.0 - 370.0 mg/dL Final    Albumin/Creatine Ratio 12/15/2023 5.8  <30.0 ug/mg Creat Final    Color, Urine 12/15/2023 Yellow  Straw, Yellow Final    Appearance, Urine 12/15/2023 Clear  Clear Final    Specific Gravity, Urine 12/15/2023 1.015  1.005 - 1.035 Final    pH, Urine 12/15/2023 5.0  5.0, 5.5, 6.0, 6.5, 7.0, 7.5, 8.0 Final    Protein, Urine 12/15/2023 NEGATIVE  NEGATIVE mg/dL Final    Glucose, Urine 12/15/2023 NEGATIVE  NEGATIVE mg/dL Final    Blood, Urine 12/15/2023 NEGATIVE  NEGATIVE Final    Ketones, Urine 12/15/2023 NEGATIVE  NEGATIVE mg/dL Final    Bilirubin, Urine 12/15/2023 NEGATIVE  NEGATIVE Final    Urobilinogen, Urine 12/15/2023 <2.0  <2.0 mg/dL Final    Nitrite, Urine 12/15/2023 NEGATIVE  NEGATIVE Final    Leukocyte Esterase, Urine 12/15/2023 NEGATIVE  NEGATIVE Final   Lab on 09/13/2023   Component Date Value Ref Range Status    WBC 09/13/2023 6.5  4.4 - 11.3 x10E9/L Final    nRBC 09/13/2023 0.0  0.0 - 0.0 /100 WBC Final    RBC 09/13/2023 5.09  4.50 - 5.90 x10E12/L Final    Hemoglobin 09/13/2023 15.8  13.5 - 17.5 g/dL Final    Hematocrit 09/13/2023 48.6  41.0 - 52.0 % Final    MCV 09/13/2023 95  80 - 100 fL Final    MCHC 09/13/2023 32.5  32.0 - 36.0 g/dL Final    Platelets 09/13/2023 288  150 - 450 x10E9/L Final    RDW 09/13/2023 12.9  11.5 - 14.5 % Final    Neutrophils % 09/13/2023 55.3  40.0 - 80.0 % Final    Immature Granulocytes %, Automated 09/13/2023 0.5  0.0 - 0.9 % Final      Immature Granulocyte Count (IG) includes promyelocytes,    myelocytes and metamyelocytes but does not include bands.   Percent differential counts (%) should be interpreted in the   context of the absolute cell counts (cells/L).    Lymphocytes % 09/13/2023 26.8  13.0 - 44.0 % Final    Monocytes % 09/13/2023 12.7  2.0 - 10.0 % Final    Eosinophils % 09/13/2023 3.8  0.0 - 6.0 % Final    Basophils % 09/13/2023 0.9  0.0 - 2.0 % Final    Neutrophils Absolute 09/13/2023 3.62  1.20 - 7.70 x10E9/L Final    Lymphocytes Absolute 09/13/2023 1.75  1.20 - 4.80 x10E9/L Final    Monocytes Absolute 09/13/2023 0.83  0.10 - 1.00 x10E9/L Final    Eosinophils Absolute 09/13/2023 0.25  0.00 - 0.70 x10E9/L Final    Basophils Absolute 09/13/2023 0.06  0.00 - 0.10 x10E9/L Final    Glucose 09/13/2023 110 (H)  74 - 99 mg/dL Final    Sodium 09/13/2023 139  136 - 145 mmol/L Final    Potassium 09/13/2023 4.3  3.5 - 5.3 mmol/L Final    Chloride 09/13/2023 104  98 - 107 mmol/L Final    Bicarbonate 09/13/2023 24  21 - 32 mmol/L Final    Anion Gap 09/13/2023 15  10 - 20 mmol/L Final    Urea Nitrogen 09/13/2023 16  6 - 23 mg/dL Final    Creatinine 09/13/2023 1.08  0.50 - 1.30 mg/dL Final    GFR MALE 09/13/2023 83  >90 mL/min/1.73m2 Final     CALCULATIONS OF ESTIMATED GFR ARE PERFORMED   USING THE 2021 CKD-EPI STUDY REFIT EQUATION   WITHOUT THE RACE VARIABLE FOR THE IDMS-TRACEABLE   CREATININE METHODS.    https://jasn.asnjournals.org/content/early/2021/09/22/ASN.6053884801    Calcium 09/13/2023 9.8  8.6 - 10.6 mg/dL Final    Albumin 09/13/2023 4.6  3.4 - 5.0 g/dL Final    Alkaline Phosphatase 09/13/2023 46  33 - 120 U/L Final    Total Protein 09/13/2023 6.9  6.4 - 8.2 g/dL Final    AST 09/13/2023 29  9 - 39 U/L Final    Total Bilirubin 09/13/2023 0.7  0.0 - 1.2 mg/dL Final    ALT (SGPT) 09/13/2023 32  10 - 52 U/L Final     Patients treated with Sulfasalazine may generate    falsely decreased results for ALT.    TSH 09/13/2023 1.14  0.44 - 3.98 mIU/L  Final     TSH testing is performed using different testing    methodology at [facility] than at other    [facility]. Direct result comparisons should    only be made within the same method.    Cholesterol 09/13/2023 208 (H)  0 - 199 mg/dL Final    .      AGE      DESIRABLE   BORDERLINE HIGH   HIGH     0-19 Y     0 - 169       170 - 199     >/= 200    20-24 Y     0 - 189       190 - 224     >/= 225         >24 Y     0 - 199       200 - 239     >/= 240   **All ranges are based on fasting samples. Specific   therapeutic targets will vary based on patient-specific   cardiac risk.  .   Pediatric guidelines reference:Pediatrics 2011, 128(S5).   Adult guidelines reference: NCEP ATPIII Guidelines,     KURT 2001, 258:2486-97  .   Venipuncture immediately after or during the    administration of Metamizole may lead to falsely   low results. Testing should be performed immediately   prior to Metamizole dosing.    HDL 09/13/2023 40.5  mg/dL Final    .      AGE      VERY LOW   LOW     NORMAL    HIGH       0-19 Y       < 35   < 40     40-45     ----    20-24 Y       ----   < 40       >45     ----      >24 Y       ----   < 40     40-60      >60  .    Cholesterol/HDL Ratio 09/13/2023 5.1 (A)   Final    REF VALUES  DESIRABLE  < 3.4  HIGH RISK  > 5.0    LDL 09/13/2023 133 (H)  0 - 99 mg/dL Final    .                           NEAR      BORD      AGE      DESIRABLE  OPTIMAL    HIGH     HIGH     VERY HIGH     0-19 Y     0 - 109     ---    110-129   >/= 130     ----    20-24 Y     0 - 119     ---    120-159   >/= 160     ----      >24 Y     0 -  99   100-129  130-159   160-189     >/=190  .    VLDL 09/13/2023 34  0 - 40 mg/dL Final    Triglycerides 09/13/2023 172 (H)  0 - 149 mg/dL Final    .      AGE      DESIRABLE   BORDERLINE HIGH   HIGH     VERY HIGH   0 D-90 D    19 - 174         ----         ----        ----  91 D- 9 Y     0 -  74        75 -  99     >/= 100      ----    10-19 Y     0 -  89        90 - 129      >/= 130      ----    20-24 Y     0 - 114       115 - 149     >/= 150      ----         >24 Y     0 - 149       150 - 199    200- 499    >/= 500  .   Venipuncture immediately after or during the    administration of Metamizole may lead to falsely   low results. Testing should be performed immediately   prior to Metamizole dosing.    PSA 09/13/2023 0.56  0.00 - 4.00 ng/mL Final    The FDA requires that the method used for PSA assay be   reported to the physician. Values obtained with different   assay methods must not be used interchangeably. This test   was performed at [facility] using the Siemens  [employer] PSA method, which is a sandwich immunoassay using   chemiluminescence for quantitation. The assay is approved  for measurement of prostate-specific antigen (PSA) in   serum and may be used in conjunction with a digital rectal  examination in men 50 years and older as an aid in   detection of prostate cancer.   5-Alpha-reductase inhibitors (e.g. Proscar, Finasteride,   Avodart, Dutasteride and Gaviota) for the treatment of BPH   have been shown to lower PSA levels by an average of 50%   after 6 months of treatment.    Hemoglobin A1C 09/13/2023 6.4 (A)  % Final         Diagnosis of Diabetes-Adults   Non-Diabetic: < or = 5.6%   Increased risk for developing diabetes: 5.7-6.4%   Diagnostic of diabetes: > or = 6.5%  .       Monitoring of Diabetes                Age (y)     Therapeutic Goal (%)   Adults:          >18           <7.0   Pediatrics:    13-18           <7.5                   7-12           <8.0                   0- 6            7.5-8.5   American Diabetes Association. Diabetes Care 33(S1), Jan 2010.    Estimated Average Glucose 09/13/2023 137  MG/DL Final     Current Outpatient Medications on File Prior to Visit   Medication Sig Dispense Refill    losartan (Cozaar) 100 mg tablet TAKE 1 TABLET BY MOUTH EVERY DAY 90 tablet 2    metFORMIN (Glucophage) 500 mg tablet TAKE 1 TABLET BY MOUTH EVERY  DAY 90 tablet 1    pravastatin (Pravachol) 10 mg tablet Take 1 tablet (10 mg) by mouth once daily. (Patient not taking: Reported on 2/15/2024) 30 tablet 5     No current facility-administered medications on file prior to visit.     No images are attached to the encounter.            Assessment/Plan   Problem List Items Addressed This Visit             ICD-10-CM    Type 2 diabetes mellitus without complication, without long-term current use of insulin (CMS/[facility]) E11.9     Lab studies are being performed.    Previous hemoglobin A1c is well-controlled         Trigeminal neuralgia - Primary G50.0     The evaluating trigeminal neuralgia.    Starting Neurontin therapy.    MRI of the brain with and without contrast being performed.  Checking labs including CMP's and lipids hemoglobin A1c sed rate and CRP         Relevant Medications    gabapentin (Neurontin) 100 mg capsule

## 2024-02-15 NOTE — PATIENT INSTRUCTIONS
Findings are consistent with trigeminal neuralgia.    Starting Neurontin therapy.  If any troubles with anxiety or depression while taking medicine please stop it and call and let me know.    MRI of the brain with and without contrast are being performed.    I would like to know how you are doing with the medicine in 1 week.

## 2024-02-19 ENCOUNTER — TELEPHONE (OUTPATIENT)
Dept: PRIMARY CARE | Facility: CLINIC | Age: 54
End: 2024-02-19
Payer: COMMERCIAL

## 2024-02-19 NOTE — TELEPHONE ENCOUNTER
Pt was seen on 2/15/24 and was prescribed gabapentin for pain. He has been taking it every 8 hours. He stated that the pain comes back 1 hour before the next pill. He would like to know either if he can take sooner than every 8 hours or increase the dose.    Please advice

## 2024-02-20 NOTE — TELEPHONE ENCOUNTER
Pt informed and will take four times a day and will see how that goes. He will call us if not improving.

## 2024-02-27 ENCOUNTER — HOSPITAL ENCOUNTER (OUTPATIENT)
Dept: RADIOLOGY | Facility: CLINIC | Age: 54
Discharge: HOME | End: 2024-02-27
Payer: COMMERCIAL

## 2024-02-27 DIAGNOSIS — G50.0 TRIGEMINAL NEURALGIA: ICD-10-CM

## 2024-02-27 PROCEDURE — A9575 INJ GADOTERATE MEGLUMI 0.1ML: HCPCS | Performed by: FAMILY MEDICINE

## 2024-02-27 PROCEDURE — 2500000004 HC RX 250 GENERAL PHARMACY W/ HCPCS (ALT 636 FOR OP/ED): Performed by: FAMILY MEDICINE

## 2024-02-27 PROCEDURE — 70553 MRI BRAIN STEM W/O & W/DYE: CPT | Performed by: RADIOLOGY

## 2024-02-27 PROCEDURE — 70553 MRI BRAIN STEM W/O & W/DYE: CPT

## 2024-02-27 RX ORDER — GADOTERATE MEGLUMINE 376.9 MG/ML
19 INJECTION INTRAVENOUS
Status: COMPLETED | OUTPATIENT
Start: 2024-02-27 | End: 2024-02-27

## 2024-02-27 RX ADMIN — GADOTERATE MEGLUMINE 19 ML: 376.9 INJECTION INTRAVENOUS at 09:45

## 2024-04-05 ENCOUNTER — OFFICE VISIT (OUTPATIENT)
Dept: PRIMARY CARE | Facility: CLINIC | Age: 54
End: 2024-04-05
Payer: COMMERCIAL

## 2024-04-05 VITALS
DIASTOLIC BLOOD PRESSURE: 81 MMHG | HEART RATE: 76 BPM | OXYGEN SATURATION: 95 % | WEIGHT: 199 LBS | SYSTOLIC BLOOD PRESSURE: 117 MMHG | RESPIRATION RATE: 16 BRPM | BODY MASS INDEX: 28.55 KG/M2

## 2024-04-05 DIAGNOSIS — E78.5 HYPERLIPIDEMIA, UNSPECIFIED HYPERLIPIDEMIA TYPE: ICD-10-CM

## 2024-04-05 DIAGNOSIS — Z00.00 ENCOUNTER FOR ANNUAL GENERAL MEDICAL EXAMINATION WITHOUT ABNORMAL FINDINGS IN ADULT: ICD-10-CM

## 2024-04-05 DIAGNOSIS — G50.0 TRIGEMINAL NEURALGIA: ICD-10-CM

## 2024-04-05 DIAGNOSIS — I10 PRIMARY HYPERTENSION: ICD-10-CM

## 2024-04-05 DIAGNOSIS — E78.00 ELEVATED SERUM CHOLESTEROL: ICD-10-CM

## 2024-04-05 DIAGNOSIS — E11.9 TYPE 2 DIABETES MELLITUS WITHOUT COMPLICATION, WITHOUT LONG-TERM CURRENT USE OF INSULIN (MULTI): Primary | ICD-10-CM

## 2024-04-05 PROBLEM — R73.09 ELEVATED GLUCOSE: Status: RESOLVED | Noted: 2023-09-11 | Resolved: 2024-04-05

## 2024-04-05 PROCEDURE — 99213 OFFICE O/P EST LOW 20 MIN: CPT | Performed by: FAMILY MEDICINE

## 2024-04-05 PROCEDURE — 1036F TOBACCO NON-USER: CPT | Performed by: FAMILY MEDICINE

## 2024-04-05 PROCEDURE — 3044F HG A1C LEVEL LT 7.0%: CPT | Performed by: FAMILY MEDICINE

## 2024-04-05 PROCEDURE — 3050F LDL-C >= 130 MG/DL: CPT | Performed by: FAMILY MEDICINE

## 2024-04-05 PROCEDURE — 3074F SYST BP LT 130 MM HG: CPT | Performed by: FAMILY MEDICINE

## 2024-04-05 PROCEDURE — 4010F ACE/ARB THERAPY RXD/TAKEN: CPT | Performed by: FAMILY MEDICINE

## 2024-04-05 PROCEDURE — 3079F DIAST BP 80-89 MM HG: CPT | Performed by: FAMILY MEDICINE

## 2024-04-05 RX ORDER — PRAVASTATIN SODIUM 10 MG/1
10 TABLET ORAL DAILY
Qty: 30 TABLET | Refills: 0 | Status: SHIPPED | OUTPATIENT
Start: 2024-04-05 | End: 2024-04-29

## 2024-04-05 ASSESSMENT — ENCOUNTER SYMPTOMS
ENDOCRINE NEGATIVE: 1
EYES NEGATIVE: 1
RESPIRATORY NEGATIVE: 1
OCCASIONAL FEELINGS OF UNSTEADINESS: 0
CARDIOVASCULAR NEGATIVE: 1
LOSS OF SENSATION IN FEET: 0
CONSTITUTIONAL NEGATIVE: 1
DEPRESSION: 0

## 2024-04-05 NOTE — PROGRESS NOTES
Subjective   Patient ID: Sherwin Saldana is a 53 y.o. male who presents for Follow-up (Review recent testing).    Overall doing well.  No troubles with chest pain or shortness of breath no dizziness no lightheadedness no abdominal pain or discomfort         Review of Systems   Constitutional: Negative.    HENT: Negative.     Eyes: Negative.    Respiratory: Negative.     Cardiovascular: Negative.    Endocrine: Negative.        Objective   /81   Pulse 76   Resp 16   Wt 90.3 kg (199 lb)   SpO2 95%   BMI 28.55 kg/m²   BSA Body surface area is 2.11 meters squared.      Physical Exam  HENT:      Head: Normocephalic.      Right Ear: Tympanic membrane normal.   Cardiovascular:      Rate and Rhythm: Normal rate.   Pulmonary:      Effort: Pulmonary effort is normal.      Breath sounds: Normal breath sounds.   Neurological:      Mental Status: He is alert.       Lab on 02/15/2024   Component Date Value Ref Range Status    Glucose 02/15/2024 139 (H)  74 - 99 mg/dL Final    Sodium 02/15/2024 142  136 - 145 mmol/L Final    Potassium 02/15/2024 4.6  3.5 - 5.3 mmol/L Final    Chloride 02/15/2024 102  98 - 107 mmol/L Final    Bicarbonate 02/15/2024 27  21 - 32 mmol/L Final    Anion Gap 02/15/2024 18  10 - 20 mmol/L Final    Urea Nitrogen 02/15/2024 12  6 - 23 mg/dL Final    Creatinine 02/15/2024 0.90  0.50 - 1.30 mg/dL Final    eGFR 02/15/2024 >90  >60 mL/min/1.73m*2 Final    Calculations of estimated GFR are performed using the 2021 CKD-EPI Study Refit equation without the race variable for the IDMS-Traceable creatinine methods.  https://jasn.asnjournals.org/content/early/2021/09/22/ASN.1398993380    Calcium 02/15/2024 10.2  8.6 - 10.6 mg/dL Final    Albumin 02/15/2024 4.8  3.4 - 5.0 g/dL Final    Alkaline Phosphatase 02/15/2024 46  33 - 120 U/L Final    Total Protein 02/15/2024 7.1  6.4 - 8.2 g/dL Final    AST 02/15/2024 26  9 - 39 U/L Final    Bilirubin, Total 02/15/2024 0.5  0.0 - 1.2 mg/dL Final    ALT 02/15/2024 30   10 - 52 U/L Final    Patients treated with Sulfasalazine may generate falsely decreased results for ALT.    Hemoglobin A1C 02/15/2024 6.6 (H)  see below % Final    Estimated Average Glucose 02/15/2024 143  Not Established mg/dL Final    Cholesterol 02/15/2024 261 (H)  0 - 199 mg/dL Final          Age      Desirable   Borderline High   High     0-19 Y     0 - 169       170 - 199     >/= 200    20-24 Y     0 - 189       190 - 224     >/= 225         >24 Y     0 - 199       200 - 239     >/= 240   **All ranges are based on fasting samples. Specific   therapeutic targets will vary based on patient-specific   cardiac risk.    Pediatric guidelines reference:Pediatrics 2011, 128(S5).Adult guidelines reference: NCEP ATPIII Guidelines,KURT 2001, 258:2486-97    Venipuncture immediately after or during the administration of Metamizole may lead to falsely low results. Testing should be performed immediately prior to Metamizole dosing.    HDL-Cholesterol 02/15/2024 47.3  mg/dL Final      Age       Very Low   Low     Normal    High    0-19 Y    < 35      < 40     40-45     ----  20-24 Y    ----     < 40      >45      ----        >24 Y      ----     < 40     40-60      >60      Cholesterol/HDL Ratio 02/15/2024 5.5   Final      Ref Values  Desirable  < 3.4  High Risk  > 5.0    LDL Calculated 02/15/2024 177 (H)  <=99 mg/dL Final                                Near   Borderline      AGE      Desirable  Optimal    High     High     Very High     0-19 Y     0 - 109     ---    110-129   >/= 130     ----    20-24 Y     0 - 119     ---    120-159   >/= 160     ----      >24 Y     0 -  99   100-129  130-159   160-189     >/=190      VLDL 02/15/2024 37  0 - 40 mg/dL Final    Triglycerides 02/15/2024 184 (H)  0 - 149 mg/dL Final       Age         Desirable   Borderline High   High     Very High   0 D-90 D    19 - 174         ----         ----        ----  91 D- 9 Y     0 -  74        75 -  99     >/= 100      ----    10-19 Y     0 -  89         90 - 129     >/= 130      ----    20-24 Y     0 - 114       115 - 149     >/= 150      ----         >24 Y     0 - 149       150 - 199    200- 499    >/= 500    Venipuncture immediately after or during the administration of Metamizole may lead to falsely low results. Testing should be performed immediately prior to Metamizole dosing.    Non HDL Cholesterol 02/15/2024 214 (H)  0 - 149 mg/dL Final          Age       Desirable   Borderline High   High     Very High     0-19 Y     0 - 119       120 - 144     >/= 145    >/= 160    20-24 Y     0 - 149       150 - 189     >/= 190      ----         >24 Y    30 mg/dL above LDL Cholesterol goal      Sedimentation Rate 02/15/2024 3  0 - 20 mm/h Final    C-Reactive Protein 02/15/2024 <0.10  <1.00 mg/dL Final   Lab on 12/15/2023   Component Date Value Ref Range Status    Glucose 12/15/2023 107 (H)  74 - 99 mg/dL Final    Sodium 12/15/2023 141  136 - 145 mmol/L Final    Potassium 12/15/2023 4.7  3.5 - 5.3 mmol/L Final    Chloride 12/15/2023 103  98 - 107 mmol/L Final    Bicarbonate 12/15/2023 28  21 - 32 mmol/L Final    Anion Gap 12/15/2023 15  10 - 20 mmol/L Final    Urea Nitrogen 12/15/2023 14  6 - 23 mg/dL Final    Creatinine 12/15/2023 1.02  0.50 - 1.30 mg/dL Final    eGFR 12/15/2023 89  >60 mL/min/1.73m*2 Final    Calculations of estimated GFR are performed using the 2021 CKD-EPI Study Refit equation without the race variable for the IDMS-Traceable creatinine methods.  https://jasn.asnjournals.org/content/early/2021/09/22/ASN.8012954823    Calcium 12/15/2023 10.2  8.6 - 10.6 mg/dL Final    Albumin 12/15/2023 4.9  3.4 - 5.0 g/dL Final    Alkaline Phosphatase 12/15/2023 43  33 - 120 U/L Final    Total Protein 12/15/2023 7.4  6.4 - 8.2 g/dL Final    AST 12/15/2023 30  9 - 39 U/L Final    Bilirubin, Total 12/15/2023 0.7  0.0 - 1.2 mg/dL Final    ALT 12/15/2023 27  10 - 52 U/L Final    Patients treated with Sulfasalazine may generate falsely decreased results for ALT.     Cholesterol 12/15/2023 196  0 - 199 mg/dL Final          Age      Desirable   Borderline High   High     0-19 Y     0 - 169       170 - 199     >/= 200    20-24 Y     0 - 189       190 - 224     >/= 225         >24 Y     0 - 199       200 - 239     >/= 240   **All ranges are based on fasting samples. Specific   therapeutic targets will vary based on patient-specific   cardiac risk.    Pediatric guidelines reference:Pediatrics 2011, 128(S5).Adult guidelines reference: NCEP ATPIII Guidelines,KURT 2001, 258:2486-97    Venipuncture immediately after or during the administration of Metamizole may lead to falsely low results. Testing should be performed immediately prior to Metamizole dosing.    HDL-Cholesterol 12/15/2023 46.3  mg/dL Final      Age       Very Low   Low     Normal    High    0-19 Y    < 35      < 40     40-45     ----  20-24 Y    ----     < 40      >45      ----        >24 Y      ----     < 40     40-60      >60      Cholesterol/HDL Ratio 12/15/2023 4.2   Final      Ref Values  Desirable  < 3.4  High Risk  > 5.0    LDL Calculated 12/15/2023 125 (H)  <=99 mg/dL Final                                Near   Borderline      AGE      Desirable  Optimal    High     High     Very High     0-19 Y     0 - 109     ---    110-129   >/= 130     ----    20-24 Y     0 - 119     ---    120-159   >/= 160     ----      >24 Y     0 -  99   100-129  130-159   160-189     >/=190      VLDL 12/15/2023 25  0 - 40 mg/dL Final    Triglycerides 12/15/2023 123  0 - 149 mg/dL Final       Age         Desirable   Borderline High   High     Very High   0 D-90 D    19 - 174         ----         ----        ----  91 D- 9 Y     0 -  74        75 -  99     >/= 100      ----    10-19 Y     0 -  89        90 - 129     >/= 130      ----    20-24 Y     0 - 114       115 - 149     >/= 150      ----         >24 Y     0 - 149       150 - 199    200- 499    >/= 500    Venipuncture immediately after or during the administration of Metamizole may lead  to falsely low results. Testing should be performed immediately prior to Metamizole dosing.    Non HDL Cholesterol 12/15/2023 150 (H)  0 - 149 mg/dL Final          Age       Desirable   Borderline High   High     Very High     0-19 Y     0 - 119       120 - 144     >/= 145    >/= 160    20-24 Y     0 - 149       150 - 189     >/= 190      ----         >24 Y    30 mg/dL above LDL Cholesterol goal      Creatine Kinase 12/15/2023 31  0 - 325 U/L Final    C-Reactive Protein 12/15/2023 0.20  <1.00 mg/dL Final    Sedimentation Rate 12/15/2023 2  0 - 20 mm/h Final    Albumin, Urine Random 12/15/2023 7.1  Not established mg/L Final    Creatinine, Urine Random 12/15/2023 122.2  20.0 - 370.0 mg/dL Final    Albumin/Creatine Ratio 12/15/2023 5.8  <30.0 ug/mg Creat Final    Color, Urine 12/15/2023 Yellow  Straw, Yellow Final    Appearance, Urine 12/15/2023 Clear  Clear Final    Specific Gravity, Urine 12/15/2023 1.015  1.005 - 1.035 Final    pH, Urine 12/15/2023 5.0  5.0, 5.5, 6.0, 6.5, 7.0, 7.5, 8.0 Final    Protein, Urine 12/15/2023 NEGATIVE  NEGATIVE mg/dL Final    Glucose, Urine 12/15/2023 NEGATIVE  NEGATIVE mg/dL Final    Blood, Urine 12/15/2023 NEGATIVE  NEGATIVE Final    Ketones, Urine 12/15/2023 NEGATIVE  NEGATIVE mg/dL Final    Bilirubin, Urine 12/15/2023 NEGATIVE  NEGATIVE Final    Urobilinogen, Urine 12/15/2023 <2.0  <2.0 mg/dL Final    Nitrite, Urine 12/15/2023 NEGATIVE  NEGATIVE Final    Leukocyte Esterase, Urine 12/15/2023 NEGATIVE  NEGATIVE Final   Lab on 09/13/2023   Component Date Value Ref Range Status    WBC 09/13/2023 6.5  4.4 - 11.3 x10E9/L Final    nRBC 09/13/2023 0.0  0.0 - 0.0 /100 WBC Final    RBC 09/13/2023 5.09  4.50 - 5.90 x10E12/L Final    Hemoglobin 09/13/2023 15.8  13.5 - 17.5 g/dL Final    Hematocrit 09/13/2023 48.6  41.0 - 52.0 % Final    MCV 09/13/2023 95  80 - 100 fL Final    MCHC 09/13/2023 32.5  32.0 - 36.0 g/dL Final    Platelets 09/13/2023 288  150 - 450 x10E9/L Final    RDW 09/13/2023 12.9   11.5 - 14.5 % Final    Neutrophils % 09/13/2023 55.3  40.0 - 80.0 % Final    Immature Granulocytes %, Automated 09/13/2023 0.5  0.0 - 0.9 % Final     Immature Granulocyte Count (IG) includes promyelocytes,    myelocytes and metamyelocytes but does not include bands.   Percent differential counts (%) should be interpreted in the   context of the absolute cell counts (cells/L).    Lymphocytes % 09/13/2023 26.8  13.0 - 44.0 % Final    Monocytes % 09/13/2023 12.7  2.0 - 10.0 % Final    Eosinophils % 09/13/2023 3.8  0.0 - 6.0 % Final    Basophils % 09/13/2023 0.9  0.0 - 2.0 % Final    Neutrophils Absolute 09/13/2023 3.62  1.20 - 7.70 x10E9/L Final    Lymphocytes Absolute 09/13/2023 1.75  1.20 - 4.80 x10E9/L Final    Monocytes Absolute 09/13/2023 0.83  0.10 - 1.00 x10E9/L Final    Eosinophils Absolute 09/13/2023 0.25  0.00 - 0.70 x10E9/L Final    Basophils Absolute 09/13/2023 0.06  0.00 - 0.10 x10E9/L Final    Glucose 09/13/2023 110 (H)  74 - 99 mg/dL Final    Sodium 09/13/2023 139  136 - 145 mmol/L Final    Potassium 09/13/2023 4.3  3.5 - 5.3 mmol/L Final    Chloride 09/13/2023 104  98 - 107 mmol/L Final    Bicarbonate 09/13/2023 24  21 - 32 mmol/L Final    Anion Gap 09/13/2023 15  10 - 20 mmol/L Final    Urea Nitrogen 09/13/2023 16  6 - 23 mg/dL Final    Creatinine 09/13/2023 1.08  0.50 - 1.30 mg/dL Final    GFR MALE 09/13/2023 83  >90 mL/min/1.73m2 Final     CALCULATIONS OF ESTIMATED GFR ARE PERFORMED   USING THE 2021 CKD-EPI STUDY REFIT EQUATION   WITHOUT THE RACE VARIABLE FOR THE IDMS-TRACEABLE   CREATININE METHODS.    https://jasn.asnjournals.org/content/early/2021/09/22/ASN.8070968901    Calcium 09/13/2023 9.8  8.6 - 10.6 mg/dL Final    Albumin 09/13/2023 4.6  3.4 - 5.0 g/dL Final    Alkaline Phosphatase 09/13/2023 46  33 - 120 U/L Final    Total Protein 09/13/2023 6.9  6.4 - 8.2 g/dL Final    AST 09/13/2023 29  9 - 39 U/L Final    Total Bilirubin 09/13/2023 0.7  0.0 - 1.2 mg/dL Final    ALT (SGPT) 09/13/2023 32   10 - 52 U/L Final     Patients treated with Sulfasalazine may generate    falsely decreased results for ALT.    TSH 09/13/2023 1.14  0.44 - 3.98 mIU/L Final     TSH testing is performed using different testing    methodology at [facility] than at other    [facility]. Direct result comparisons should    only be made within the same method.    Cholesterol 09/13/2023 208 (H)  0 - 199 mg/dL Final    .      AGE      DESIRABLE   BORDERLINE HIGH   HIGH     0-19 Y     0 - 169       170 - 199     >/= 200    20-24 Y     0 - 189       190 - 224     >/= 225         >24 Y     0 - 199       200 - 239     >/= 240   **All ranges are based on fasting samples. Specific   therapeutic targets will vary based on patient-specific   cardiac risk.  .   Pediatric guidelines reference:Pediatrics 2011, 128(S5).   Adult guidelines reference: NCEP ATPIII Guidelines,     KURT 2001, 258:1166-97  .   Venipuncture immediately after or during the    administration of Metamizole may lead to falsely   low results. Testing should be performed immediately   prior to Metamizole dosing.    HDL 09/13/2023 40.5  mg/dL Final    .      AGE      VERY LOW   LOW     NORMAL    HIGH       0-19 Y       < 35   < 40     40-45     ----    20-24 Y       ----   < 40       >45     ----      >24 Y       ----   < 40     40-60      >60  .    Cholesterol/HDL Ratio 09/13/2023 5.1 (A)   Final    REF VALUES  DESIRABLE  < 3.4  HIGH RISK  > 5.0    LDL 09/13/2023 133 (H)  0 - 99 mg/dL Final    .                           NEAR      BORD      AGE      DESIRABLE  OPTIMAL    HIGH     HIGH     VERY HIGH     0-19 Y     0 - 109     ---    110-129   >/= 130     ----    20-24 Y     0 - 119     ---    120-159   >/= 160     ----      >24 Y     0 -  99   100-129  130-159   160-189     >/=190  .    VLDL 09/13/2023 34  0 - 40 mg/dL Final    Triglycerides 09/13/2023 172 (H)  0 - 149 mg/dL Final    .      AGE      DESIRABLE   BORDERLINE HIGH   HIGH     VERY HIGH   0 D-90 D     19 - 174         ----         ----        ----  91 D- 9 Y     0 -  74        75 -  99     >/= 100      ----    10-19 Y     0 -  89        90 - 129     >/= 130      ----    20-24 Y     0 - 114       115 - 149     >/= 150      ----         >24 Y     0 - 149       150 - 199    200- 499    >/= 500  .   Venipuncture immediately after or during the    administration of Metamizole may lead to falsely   low results. Testing should be performed immediately   prior to Metamizole dosing.    PSA 09/13/2023 0.56  0.00 - 4.00 ng/mL Final    The FDA requires that the method used for PSA assay be   reported to the physician. Values obtained with different   assay methods must not be used interchangeably. This test   was performed at [facility] (formerly [facility]3] using the Siemens  Diveboard PSA method, which is a sandwich immunoassay using   chemiluminescence for quantitation. The assay is approved  for measurement of prostate-specific antigen (PSA) in   serum and may be used in conjunction with a digital rectal  examination in men 50 years and older as an aid in   detection of prostate cancer.   5-Alpha-reductase inhibitors (e.g. Proscar, Finasteride,   Avodart, Dutasteride and Gaviota) for the treatment of BPH   have been shown to lower PSA levels by an average of 50%   after 6 months of treatment.    Hemoglobin A1C 09/13/2023 6.4 (A)  % Final         Diagnosis of Diabetes-Adults   Non-Diabetic: < or = 5.6%   Increased risk for developing diabetes: 5.7-6.4%   Diagnostic of diabetes: > or = 6.5%  .       Monitoring of Diabetes                Age (y)     Therapeutic Goal (%)   Adults:          >18           <7.0   Pediatrics:    13-18           <7.5                   7-12           <8.0                   0- 6            7.5-8.5   American Diabetes Association. Diabetes Care 33(S1), Jan 2010.    Estimated Average Glucose 09/13/2023 137  MG/DL Final     Current Outpatient Medications on File Prior to Visit   Medication Sig Dispense Refill     losartan (Cozaar) 100 mg tablet TAKE 1 TABLET BY MOUTH EVERY DAY 90 tablet 2    metFORMIN (Glucophage) 500 mg tablet TAKE 1 TABLET BY MOUTH EVERY DAY 90 tablet 1    gabapentin (Neurontin) 100 mg capsule Take 1 capsule (100 mg) by mouth 3 times a day. (Patient not taking: Reported on 4/5/2024) 90 capsule 0    pravastatin (Pravachol) 10 mg tablet Take 1 tablet (10 mg) by mouth once daily. (Patient not taking: Reported on 2/15/2024) 30 tablet 5     No current facility-administered medications on file prior to visit.     No images are attached to the encounter.            Assessment/Plan   Problem List Items Addressed This Visit             ICD-10-CM    Elevated serum cholesterol E78.00    Hyperlipemia E78.5     Going to try once again statin therapy.         Relevant Medications    pravastatin (Pravachol) 10 mg tablet    Hypertension I10     Blood pressure well-controlled         Type 2 diabetes mellitus without complication, without long-term current use of insulin (CMS/[facility]) - Primary E11.9     Doing well, hemoglobin A1c is at goal         Trigeminal neuralgia G50.0     This has resolved with the short-term use of gabapentin          Other Visit Diagnoses         Codes    Encounter for annual general medical examination without abnormal findings in adult     Z00.00

## 2024-04-05 NOTE — PATIENT INSTRUCTIONS
Overall doing well.    Going to retry statin therapy.  Using Pravachol 10 mg daily.  Also recommend using coenzyme Q 10 with this medication.    Would like to recheck labs in 30 days.  If you should develop any difficulties with muscle aches pains or discomfort while taking the medicine, please call and let me know.    I will let you know what the labs show.

## 2024-04-27 DIAGNOSIS — E78.5 HYPERLIPIDEMIA, UNSPECIFIED HYPERLIPIDEMIA TYPE: ICD-10-CM

## 2024-04-29 RX ORDER — PRAVASTATIN SODIUM 10 MG/1
10 TABLET ORAL DAILY
Qty: 90 TABLET | Refills: 1 | Status: SHIPPED | OUTPATIENT
Start: 2024-04-29

## 2024-06-04 DIAGNOSIS — E11.43 TYPE 2 DIABETES MELLITUS WITH DIABETIC AUTONOMIC (POLY)NEUROPATHY (MULTI): ICD-10-CM

## 2024-06-04 RX ORDER — METFORMIN HYDROCHLORIDE 500 MG/1
500 TABLET ORAL DAILY
Qty: 90 TABLET | Refills: 1 | Status: SHIPPED | OUTPATIENT
Start: 2024-06-04

## 2024-09-01 DIAGNOSIS — I10 ESSENTIAL (PRIMARY) HYPERTENSION: ICD-10-CM

## 2024-09-03 RX ORDER — LOSARTAN POTASSIUM 100 MG/1
TABLET ORAL
Qty: 90 TABLET | Refills: 2 | Status: SHIPPED | OUTPATIENT
Start: 2024-09-03

## 2024-10-24 DIAGNOSIS — E78.5 HYPERLIPIDEMIA, UNSPECIFIED HYPERLIPIDEMIA TYPE: ICD-10-CM

## 2024-10-24 RX ORDER — PRAVASTATIN SODIUM 10 MG/1
10 TABLET ORAL DAILY
Qty: 90 TABLET | Refills: 1 | Status: SHIPPED | OUTPATIENT
Start: 2024-10-24

## 2024-11-29 DIAGNOSIS — E11.43 TYPE 2 DIABETES MELLITUS WITH DIABETIC AUTONOMIC (POLY)NEUROPATHY: ICD-10-CM

## 2024-11-29 RX ORDER — METFORMIN HYDROCHLORIDE 500 MG/1
500 TABLET ORAL DAILY
Qty: 90 TABLET | Refills: 1 | Status: SHIPPED | OUTPATIENT
Start: 2024-11-29

## 2025-01-07 ENCOUNTER — APPOINTMENT (OUTPATIENT)
Dept: PRIMARY CARE | Facility: CLINIC | Age: 55
End: 2025-01-07
Payer: COMMERCIAL

## 2025-01-07 VITALS
HEIGHT: 70 IN | DIASTOLIC BLOOD PRESSURE: 78 MMHG | TEMPERATURE: 97.8 F | SYSTOLIC BLOOD PRESSURE: 112 MMHG | HEART RATE: 67 BPM | WEIGHT: 195 LBS | BODY MASS INDEX: 27.92 KG/M2 | OXYGEN SATURATION: 96 %

## 2025-01-07 DIAGNOSIS — E78.5 HYPERLIPIDEMIA, UNSPECIFIED HYPERLIPIDEMIA TYPE: ICD-10-CM

## 2025-01-07 DIAGNOSIS — F43.22 ADJUSTMENT DISORDER WITH ANXIOUS MOOD: Primary | ICD-10-CM

## 2025-01-07 DIAGNOSIS — M79.10 MYALGIA: ICD-10-CM

## 2025-01-07 PROCEDURE — 3074F SYST BP LT 130 MM HG: CPT | Performed by: FAMILY MEDICINE

## 2025-01-07 PROCEDURE — 3078F DIAST BP <80 MM HG: CPT | Performed by: FAMILY MEDICINE

## 2025-01-07 PROCEDURE — 4010F ACE/ARB THERAPY RXD/TAKEN: CPT | Performed by: FAMILY MEDICINE

## 2025-01-07 PROCEDURE — 99213 OFFICE O/P EST LOW 20 MIN: CPT | Performed by: FAMILY MEDICINE

## 2025-01-07 PROCEDURE — 3008F BODY MASS INDEX DOCD: CPT | Performed by: FAMILY MEDICINE

## 2025-01-07 RX ORDER — UBIDECARENONE 30 MG
30 CAPSULE ORAL DAILY
COMMUNITY

## 2025-01-07 RX ORDER — BUPROPION HYDROCHLORIDE 150 MG/1
150 TABLET ORAL EVERY MORNING
Qty: 30 TABLET | Refills: 0 | Status: SHIPPED | OUTPATIENT
Start: 2025-01-07 | End: 2025-02-06

## 2025-01-07 ASSESSMENT — PATIENT HEALTH QUESTIONNAIRE - PHQ9
5. POOR APPETITE OR OVEREATING: NEARLY EVERY DAY
8. MOVING OR SPEAKING SO SLOWLY THAT OTHER PEOPLE COULD HAVE NOTICED. OR THE OPPOSITE, BEING SO FIGETY OR RESTLESS THAT YOU HAVE BEEN MOVING AROUND A LOT MORE THAN USUAL: NEARLY EVERY DAY
7. TROUBLE CONCENTRATING ON THINGS, SUCH AS READING THE NEWSPAPER OR WATCHING TELEVISION: NEARLY EVERY DAY
2. FEELING DOWN, DEPRESSED OR HOPELESS: NEARLY EVERY DAY
10. IF YOU CHECKED OFF ANY PROBLEMS, HOW DIFFICULT HAVE THESE PROBLEMS MADE IT FOR YOU TO DO YOUR WORK, TAKE CARE OF THINGS AT HOME, OR GET ALONG WITH OTHER PEOPLE: NOT DIFFICULT AT ALL
SUM OF ALL RESPONSES TO PHQ9 QUESTIONS 1 AND 2: 6
9. THOUGHTS THAT YOU WOULD BE BETTER OFF DEAD, OR OF HURTING YOURSELF: MORE THAN HALF THE DAYS
SUM OF ALL RESPONSES TO PHQ QUESTIONS 1-9: 20
1. LITTLE INTEREST OR PLEASURE IN DOING THINGS: NEARLY EVERY DAY
3. TROUBLE FALLING OR STAYING ASLEEP OR SLEEPING TOO MUCH: NOT AT ALL
6. FEELING BAD ABOUT YOURSELF - OR THAT YOU ARE A FAILURE OR HAVE LET YOURSELF OR YOUR FAMILY DOWN: NEARLY EVERY DAY
4. FEELING TIRED OR HAVING LITTLE ENERGY: NOT AT ALL

## 2025-01-07 ASSESSMENT — ENCOUNTER SYMPTOMS
LOSS OF SENSATION IN FEET: 0
RESPIRATORY NEGATIVE: 1
MYALGIAS: 1
DYSPHORIC MOOD: 1
SINUS PAIN: 0
UNEXPECTED WEIGHT CHANGE: 0
FATIGUE: 0
DIARRHEA: 0
CHOKING: 0
ACTIVITY CHANGE: 0
CHILLS: 0
CONFUSION: 0
SLEEP DISTURBANCE: 0
CARDIOVASCULAR NEGATIVE: 1
GASTROINTESTINAL NEGATIVE: 1
DEPRESSION: 0
ANAL BLEEDING: 0
SHORTNESS OF BREATH: 0
OCCASIONAL FEELINGS OF UNSTEADINESS: 0

## 2025-01-07 NOTE — PROGRESS NOTES
"Subjective   Patient ID: Sherwin Saldana is a 54 y.o. male who presents for discuss medications for depression/anxiety.    Some anxiety and some depression.  Patient's wife is working remotely.  This has been somewhat hard on her relationship he said to do a lot at home it seems to have gotten him somewhat down his had no thoughts of suicide or self-harm he does not drink alcohol trying exercise to getting all muscle aches and pains with being on cholesterol-lowering medicine.    He said no troubles with fever chills or night sweats.  No troubles with abdominal pain or discomfort.  No swelling of the legs or feet.  Patient had trouble statin therapy.  He has tried the rosuvastatin now he is on the pravastatin.  His CT scoring the coronary disease 0 but he is diabetic         Review of Systems   Constitutional:  Negative for activity change, chills, fatigue and unexpected weight change.   HENT:  Negative for ear discharge and sinus pain.    Respiratory: Negative.  Negative for choking and shortness of breath.    Cardiovascular: Negative.  Negative for chest pain and leg swelling.   Gastrointestinal: Negative.  Negative for anal bleeding and diarrhea.   Genitourinary:  Negative for penile discharge.   Musculoskeletal:  Positive for myalgias.   Psychiatric/Behavioral:  Positive for dysphoric mood. Negative for confusion, sleep disturbance and suicidal ideas.        Objective   /78   Pulse 67   Temp 36.6 °C (97.8 °F)   Ht 1.778 m (5' 10\")   Wt 88.5 kg (195 lb)   SpO2 96%   BMI 27.98 kg/m²   BSA Body surface area is 2.09 meters squared.      Physical Exam  Constitutional:       Appearance: Normal appearance.   HENT:      Head: Normocephalic and atraumatic.      Right Ear: Ear canal normal.      Left Ear: Ear canal normal.      Nose: Nose normal.   Eyes:      Extraocular Movements: Extraocular movements intact.      Pupils: Pupils are equal, round, and reactive to light.   Cardiovascular:      Rate and Rhythm: " Normal rate and regular rhythm.      Pulses: Normal pulses.   Pulmonary:      Effort: Pulmonary effort is normal. No respiratory distress.      Breath sounds: Normal breath sounds.   Abdominal:      General: Abdomen is flat.   Musculoskeletal:      Cervical back: No rigidity or tenderness.   Neurological:      General: No focal deficit present.      Mental Status: He is alert.   Psychiatric:         Mood and Affect: Mood normal.         Behavior: Behavior normal.       Lab on 02/15/2024   Component Date Value Ref Range Status    Glucose 02/15/2024 139 (H)  74 - 99 mg/dL Final    Sodium 02/15/2024 142  136 - 145 mmol/L Final    Potassium 02/15/2024 4.6  3.5 - 5.3 mmol/L Final    Chloride 02/15/2024 102  98 - 107 mmol/L Final    Bicarbonate 02/15/2024 27  21 - 32 mmol/L Final    Anion Gap 02/15/2024 18  10 - 20 mmol/L Final    Urea Nitrogen 02/15/2024 12  6 - 23 mg/dL Final    Creatinine 02/15/2024 0.90  0.50 - 1.30 mg/dL Final    eGFR 02/15/2024 >90  >60 mL/min/1.73m*2 Final    Calculations of estimated GFR are performed using the 2021 CKD-EPI Study Refit equation without the race variable for the IDMS-Traceable creatinine methods.  https://jasn.asnjournals.org/content/early/2021/09/22/ASN.0292271638    Calcium 02/15/2024 10.2  8.6 - 10.6 mg/dL Final    Albumin 02/15/2024 4.8  3.4 - 5.0 g/dL Final    Alkaline Phosphatase 02/15/2024 46  33 - 120 U/L Final    Total Protein 02/15/2024 7.1  6.4 - 8.2 g/dL Final    AST 02/15/2024 26  9 - 39 U/L Final    Bilirubin, Total 02/15/2024 0.5  0.0 - 1.2 mg/dL Final    ALT 02/15/2024 30  10 - 52 U/L Final    Patients treated with Sulfasalazine may generate falsely decreased results for ALT.    Hemoglobin A1C 02/15/2024 6.6 (H)  see below % Final    Estimated Average Glucose 02/15/2024 143  Not Established mg/dL Final    Cholesterol 02/15/2024 261 (H)  0 - 199 mg/dL Final          Age      Desirable   Borderline High   High     0-19 Y     0 - 169       170 - 199     >/= 200     20-24 Y     0 - 189       190 - 224     >/= 225         >24 Y     0 - 199       200 - 239     >/= 240   **All ranges are based on fasting samples. Specific   therapeutic targets will vary based on patient-specific   cardiac risk.    Pediatric guidelines reference:Pediatrics 2011, 128(S5).Adult guidelines reference: NCEP ATPIII Guidelines,KURT 2001, 258:2486-97    Venipuncture immediately after or during the administration of Metamizole may lead to falsely low results. Testing should be performed immediately prior to Metamizole dosing.    HDL-Cholesterol 02/15/2024 47.3  mg/dL Final      Age       Very Low   Low     Normal    High    0-19 Y    < 35      < 40     40-45     ----  20-24 Y    ----     < 40      >45      ----        >24 Y      ----     < 40     40-60      >60      Cholesterol/HDL Ratio 02/15/2024 5.5   Final      Ref Values  Desirable  < 3.4  High Risk  > 5.0    LDL Calculated 02/15/2024 177 (H)  <=99 mg/dL Final                                Near   Borderline      AGE      Desirable  Optimal    High     High     Very High     0-19 Y     0 - 109     ---    110-129   >/= 130     ----    20-24 Y     0 - 119     ---    120-159   >/= 160     ----      >24 Y     0 -  99   100-129  130-159   160-189     >/=190      VLDL 02/15/2024 37  0 - 40 mg/dL Final    Triglycerides 02/15/2024 184 (H)  0 - 149 mg/dL Final       Age         Desirable   Borderline High   High     Very High   0 D-90 D    19 - 174         ----         ----        ----  91 D- 9 Y     0 -  74        75 -  99     >/= 100      ----    10-19 Y     0 -  89        90 - 129     >/= 130      ----    20-24 Y     0 - 114       115 - 149     >/= 150      ----         >24 Y     0 - 149       150 - 199    200- 499    >/= 500    Venipuncture immediately after or during the administration of Metamizole may lead to falsely low results. Testing should be performed immediately prior to Metamizole dosing.    Non HDL Cholesterol 02/15/2024 214 (H)  0 - 149 mg/dL  Final          Age       Desirable   Borderline High   High     Very High     0-19 Y     0 - 119       120 - 144     >/= 145    >/= 160    20-24 Y     0 - 149       150 - 189     >/= 190      ----         >24 Y    30 mg/dL above LDL Cholesterol goal      Sedimentation Rate 02/15/2024 3  0 - 20 mm/h Final    C-Reactive Protein 02/15/2024 <0.10  <1.00 mg/dL Final     Current Outpatient Medications on File Prior to Visit   Medication Sig Dispense Refill    co-enzyme Q-10 30 mg capsule Take 1 capsule (30 mg) by mouth once daily.      losartan (Cozaar) 100 mg tablet TAKE 1 TABLET BY MOUTH EVERY DAY 90 tablet 2    metFORMIN (Glucophage) 500 mg tablet TAKE 1 TABLET BY MOUTH EVERY DAY 90 tablet 1    pravastatin (Pravachol) 10 mg tablet TAKE 1 TABLET BY MOUTH EVERY DAY 90 tablet 1     No current facility-administered medications on file prior to visit.     No images are attached to the encounter.            Assessment/Plan   Problem List Items Addressed This Visit             ICD-10-CM    Hyperlipemia E78.5    Adjustment disorder with anxious mood - Primary F43.22     Starting Wellbutrin referring to counseling         Relevant Medications    buPROPion XL (Wellbutrin XL) 150 mg 24 hr tablet    Other Relevant Orders    Follow Up In [facility]    Myalgia M79.10     Holding statin therapy

## 2025-01-07 NOTE — PATIENT INSTRUCTIONS
Starting Wellbutrin therapy.    If any troubles with increased anxiety or depression please let me know.    Referring to counseling as well.    Would like to follow-up in 4 weeks.    Please continue to exercise regularly please continue to eat healthy.    With any worsening of symptoms such as increased anxiety or depression please let me know.  Would like to follow-up in 4 weeks.    Because of the muscle aches and discomfort, going to asked that we hold the cholesterol medicine and see if these all disappear.    Will follow-up in 4 weeks to reevaluate.    Reviewed recent CT scoring of the coronary arteries which was

## 2025-01-15 ENCOUNTER — SOCIAL WORK (OUTPATIENT)
Dept: PRIMARY CARE | Facility: CLINIC | Age: 55
End: 2025-01-15
Payer: COMMERCIAL

## 2025-01-15 DIAGNOSIS — F43.22 ADJUSTMENT DISORDER WITH ANXIOUS MOOD: ICD-10-CM

## 2025-01-15 ASSESSMENT — ANXIETY QUESTIONNAIRES
2. NOT BEING ABLE TO STOP OR CONTROL WORRYING: MORE THAN HALF THE DAYS
6. BECOMING EASILY ANNOYED OR IRRITABLE: NEARLY EVERY DAY
1. FEELING NERVOUS, ANXIOUS, OR ON EDGE: NEARLY EVERY DAY
3. WORRYING TOO MUCH ABOUT DIFFERENT THINGS: SEVERAL DAYS
5. BEING SO RESTLESS THAT IT IS HARD TO SIT STILL: SEVERAL DAYS
7. FEELING AFRAID AS IF SOMETHING AWFUL MIGHT HAPPEN: SEVERAL DAYS
GAD7 TOTAL SCORE: 14
IF YOU CHECKED OFF ANY PROBLEMS ON THIS QUESTIONNAIRE, HOW DIFFICULT HAVE THESE PROBLEMS MADE IT FOR YOU TO DO YOUR WORK, TAKE CARE OF THINGS AT HOME, OR GET ALONG WITH OTHER PEOPLE: SOMEWHAT DIFFICULT
4. TROUBLE RELAXING: NEARLY EVERY DAY

## 2025-01-15 ASSESSMENT — PATIENT HEALTH QUESTIONNAIRE - PHQ9
6. FEELING BAD ABOUT YOURSELF - OR THAT YOU ARE A FAILURE OR HAVE LET YOURSELF OR YOUR FAMILY DOWN: NEARLY EVERY DAY
3. TROUBLE FALLING OR STAYING ASLEEP: NOT AT ALL
SUM OF ALL RESPONSES TO PHQ9 QUESTIONS 1 & 2: 2
7. TROUBLE CONCENTRATING ON THINGS, SUCH AS READING THE NEWSPAPER OR WATCHING TELEVISION: NEARLY EVERY DAY
SUM OF ALL RESPONSES TO PHQ QUESTIONS 1-9: 11
1. LITTLE INTEREST OR PLEASURE IN DOING THINGS: SEVERAL DAYS
5. POOR APPETITE OR OVEREATING: SEVERAL DAYS
10. IF YOU CHECKED OFF ANY PROBLEMS, HOW DIFFICULT HAVE THESE PROBLEMS MADE IT FOR YOU TO DO YOUR WORK, TAKE CARE OF THINGS AT HOME, OR GET ALONG WITH OTHER PEOPLE: SOMEWHAT DIFFICULT
8. MOVING OR SPEAKING SO SLOWLY THAT OTHER PEOPLE COULD HAVE NOTICED. OR THE OPPOSITE, BEING SO FIGETY OR RESTLESS THAT YOU HAVE BEEN MOVING AROUND A LOT MORE THAN USUAL: NOT AT ALL
4. FEELING TIRED OR HAVING LITTLE ENERGY: SEVERAL DAYS
9. THOUGHTS THAT YOU WOULD BE BETTER OFF DEAD, OR OF HURTING YOURSELF: SEVERAL DAYS
2. FEELING DOWN, DEPRESSED OR HOPELESS: SEVERAL DAYS

## 2025-01-15 NOTE — PROGRESS NOTES
Collaborative Care ([facility]) Initial Assessment    Session Time  Start: 2:06 pm  End: 3:22 pm     Collaborative Care program information (including case discussion with psychiatry, involvement of [facility] and billing when applicable) was provided and discussed with the patient. Patient Indicated understanding and agreed to proceed.   Confirm: Yes    Patient Health Questionnaire-9 Score: 11 (1/15/2025  3:40 PM)  BAHRAT-7 Total Score: 14 (1/15/2025  3:41 PM)        Reason for Visit / Chief Complaint  Chief Complaint   Patient presents with    Adjustment disorder with anxious mood       Accompanied by: Self  Guardian Status: Self  Caregiver Status: Does not have a caregiver    Review of Symptoms    Sleep   Average Hours Sleep in/Night:  5-6 hours  Prepares Self for Sleep at Time: midnight but can vary  Usual Wake up Time: 6 am  Sleep Symptoms: none, denies  Sleep Hygiene: fair sleep hygiene    Mood   Symptom Onset/Duration:  November 2024  Current Sx: little interest/pleasure doing things, feeling depressed, feeling tired/little energy, poor appetite, feeling bad about self, and trouble concentrating  Triggers: situation(s)  Past Sx:     Anxiety   Symptom Onset/Duration:  November 2024  Current Sx: feeling nervous/anxious/on edge, difficulty stopping/controlling worry, worrying too much, trouble relaxing, easily annoyed/irritable, and afraid something awful may happen  Panic / Somatic Sx:  tension,heavy chest- occasional   Triggers: situation(s)  Past Sx:     Self-Esteem / Self-Image   Self Esteem Rating (1-10 Scale, 10 being high): 2  Self-Esteem / Self Image Sx: able to identify strength    Appetite   Description of Overall Appetite: decreased appetite  Eating Behaviors:   Concerns with appetite: none, denied    Anger / Irritability  Symptoms of Anger / Irritability: easily agitated; trying to articulate emotions it tends to be anger and yelling.    Communication / Self Expression  Communication Style & Concerns: shy yet out  going, does not like to always talk about feelings    Trauma    Symptoms Onset/Duration:   Traumatic Experiences:   Current Symptoms Related to Traumatic Experience:   Triggers:     Grief / Loss / Adjustment   Symptom Onset/Duration: more than 1 year  Current Sx: no current symptoms  Factors of Grief / Loss / Adjustment: divorce and loss of loved one(s)    Hallucinations / Delusions   Hallucinations & Delusions Experienced: none, denied    Learning Concerns / Memory   Learning Concerns & Sx: none, denied  Memory Concerns & Sx: none, denied    Functional impairment   Impacting ADL's: no impairment   Impacting IADL's: No impairment  Impacting Ability : No impairment    Associated Medical Concerns   Potential Associated Factors: None      Comprehensive Behavioral Health History     Medications  Current Mental Health Medications:   Wellbutrin / bupropion XL; Dose: 150 mg; Side effects: None, denied    Past Mental Health Medications:   Zoloft ; Dose:  ; Side effects: just felt like existed     Concerns / challenges / barriers with taking medications? No concerns    Open to medication recommendations from consulting psychiatrist? Yes    Do you ever forget to take your medication? No  If yes, how often?     Mental Health Treatment History  Mental Health Treatment: individual therapy  Reason/When/Where/Outcome: during first marriage, and then [facility]    Risk History  Suicidal Thoughts/Method/Intent/Plan: past history but no current thoughts.  Suicide Attempts/Preparations:  alcohol and bottle of zoloft ; went to hospital, did not have to stay.  Number of Suicide Attempts: 1  Access to Firearms/Lethal Means: No guns in home  Non-Suicidal Self Injury: None, denied  Last [place] Risk Score:    Protective Factors: good protective factors    Violence: None, denied  Homicidal Thoughts/Method/Plan/Intent: None, denied  Homicidal Attempts/Preparations: None, denied  Number of Attempts: 0      Substance Use  History    Substances    Social History     Substance and Sexual Activity   Alcohol Use Not Currently     Social History     Substance and Sexual Activity   Drug Use Never       Substance Current Use                       Addiction Treatment     Types of Addiction Treatment:  AA  Currently Sober? Yes     Status/Length of Sobriety: sober 12+ months (sustained remission) 4 years sober    Family History    Mental Health / Conditions    Family Member Condition / Diagnosis Medications / Side Effects   Aunt; paternal Bipolar                     Substance Use    Family Member Substance Current Use   History within family (dad, brother, potentially mom)                        History of Suicide    Family Member Details               Social History    Housing   Living Situation: lives with family  Safe Housing Conditions / Feels Safe in Home: Yes    Employment  Current Employment: employed  Current Concerns/Challenges: No    Income   Current Concerns/Challenges: No  Receive Benefits/Assistance: No    Education   Status / Level of Education: Some college    Legal   Legal Considerations: None, denied    Relationships   S/O:    Parents/Guardian: dad still living   Siblings: 3 brothers  Friends: quality  Other:        Active Duty?   Are you a ?   Branch Area:   Were you in combat?   Discharge Status:   Do you receive VA Benefits:     Sexuality / Gender   Concerns with Sexuality/Gender: None, denied  Sexual Orientation: heterosexual    Preferred Gender Pronouns / Identity: He/him/his    Transportation   Transportation Concerns: active 's license    Quaker/ Spirituality   Are you Religion or Spiritual: Yes  Quaker / Practice: Baptism  Spiritual Practice:     Coping / Strengths / Supports   Coping:   higher power, breathing, walking away, cross (olive wood)  Strengths: funny and good heart, honest, reliable  Supports:  wife, sponsor, AA friends, kids and father can be      Abuse History  Physical  Abuse: No  Sexual Abuse: No  Verbal / Emotional Abuse / Bullying (+Cyber): No   Financial Abuse: No  Domestic Violence: No    Assessment Summary  / Plan    Assessment Summary:  What do you want to work on/get out of collaborative care? Finding ways to better manage anger when feeling overwhelmed    Patient being referred to collaborative care by Dr. Reid for symptoms of anxiety and depression. Patient's PHQ 9 score is 11 and BHARAT 7 score is 14. Patient was engaging and cooperative throughout the assessment.     Patient discussed that he has no problems with sleep and has a fair sleep hygiene routine. Patient discussed his anxiety and mood symptoms began in November of 2024. Patient's wife moved to her job for [place] in May of 2024 and patient is now taking care of the whole household and caring for his kids from his previous marriage. The goal is to wait until his 15 year old is done with high school and then he will be moving out to [place] after, it can be noted that he talks with his wife daily and she has been back here for a few weeks at time for holidays and such. Patient puts a lot of pressure on himself and have been feeling overwhelmed and expressed having problems with his anger and feels like he goes from 0-100 very quick.   Patient reports the current symptoms for depression, little interest/pleasure doing things, feeling depressed, feeling tired/little energy, poor appetite, feeling bad about self, and trouble concentrating. Patient reports the following symptoms for anxiety, feeling nervous/anxious/on edge, difficulty stopping/controlling worry, worrying too much, trouble relaxing, easily annoyed/irritable, and afraid something awful may happen. Occasionally patient will have chest heaviness and tension in his shoulders. Patient is not the best with talking about his feelings and communication or when he tries to it tends to come out in anger or yelling and he does not want that.     Patient denies  current suicidal ideations but reports a past of suicidal ideations. Reports taking a bottle of Zoloft and drinking alcohol and having to go to the hospital to have stomach pumped but he was not held for 24 hours. Patient discussed it was more of trying to get his now ex-wife's attention looking back at the situation. Patient denies history of any kind of abuse as well as denies hallucinations, delusions, and has no access to weapons. Patient noted for grief and loss he lost his mom in 2009 to cancer and then his wife left in 2010. Patient has been in therapy throughout the years and did find some of it helpful. Patient is currently taking Wellbutrin  mg; in the past patient took Zoloft but did not find it helpful and made him feel like he just existed. Patient is a recovering alcoholic and is 4 years sober and is active in AA and has a sponsor.     Patient is  and has two children from a previous marriage, his son is a freshman in college and his daughter is 15 years old. He has shared custody with his ex-wife. Patient works full time and has a support system including his wife, sponsor, AA friends, and his kids and dad can be as well. Patient discussed his coping skills including using his higher power, breathing, walking away, and also his wood cross that he has that he can keep in his pocket.     Patient would like to finding ways to better manage anger when feeling overwhelmed.  Plan:   bi-weekly    Follow up in 12 days (on 1/27/2025).    Provisional Findings / Impressions  Primary: adjustment disorder with anxious mood.    Secondary:     Goals

## 2025-01-17 ENCOUNTER — DOCUMENTATION (OUTPATIENT)
Dept: BEHAVIORAL HEALTH | Facility: CLINIC | Age: 55
End: 2025-01-17
Payer: COMMERCIAL

## 2025-01-17 NOTE — PROGRESS NOTES
[facility] Consult Note     Sherwin Saldana is a 54 y.o., referred to Collaborative Care for symptoms of depression and anxiety. I have reviewed the patient with the behavioral health manager and reviewed the patient's electronic record.    Recommendations:   Goals of working with collaborative care on recognizing stressors that impact his mood and identifying/implementing coping skills for better managing his irritability.   Interest in improving his communication skills with others as he typically does not like to share his feelings.     No medication recommendations at this time. Can continue increase in 150mg increments every 3-4 weeks as needed to target mood and anxiety, with maximum dose of 450mg po daily.     Collateral with [facility]:   Current psychotropic medication: Wellbutrin XL 150mg po daily   Reviewed most recent labs from 02/2024  -, wife moved to [place] for her job spring 2024 and visits for a few weeks when possible  -2 children from previous marriage, one in high school and one in college  -goal for 16 year old to graduated and into college prior to him also moving to [place]  -averages 5-6 hours of sleep per night   -attended therapy previously (during time of first marriage)  -prior event where he took a bottle of pills and was drinking alcohol, attended to by ED and reflects on it as a way he was wanting to get attention/response from his ex-wife   -4 years sober from alcohol, attends AA and active with his sponsor.    Patient Health Questionnaire-9 Score: 11 (1/15/2025  3:40 PM)  BHARAT-7 Total Score: 14 (1/15/2025  3:41 PM)    The above treatment considerations and suggestions are based on consultations with the patient's care manager and a review of information available in the electronic medical record. I have not personally examined the patient. All recommendations should be implemented with consideration of the patient's relevant prior history and current clinical status. Please feel free to  call me with any questions about the care of this patient. I can easily be reached through [employer].

## 2025-01-27 ENCOUNTER — APPOINTMENT (OUTPATIENT)
Dept: PRIMARY CARE | Facility: CLINIC | Age: 55
End: 2025-01-27
Payer: COMMERCIAL

## 2025-01-27 NOTE — PROGRESS NOTES
Collaborative Care (Co[facility])  Progress Note    Type of Interaction: In Office    Start Time: 9:06 am    End Time: 10:06 am        Appointment: Scheduled    Reason for Visit:   Chief Complaint   Patient presents with    Adjustment disorder with anxious mood    Meeting with patient in office for follow up  Discussed triggers- expectations, things going wrong first thing off the bat, time- not enough time  Negative thoughts, emotional response, physical symptoms, behavioral response  Check in with self through out the day to help with regulating emotions  Yard work- meditation in a way and help figure out root causes of how he is feeling or upset  Disconnect with wife at [place] when she came back from [place] and discussed having a conversation when he goes to visit her this time around. Things are different due to living separate because of work and how both may need to adapt.      Interval History / Patient Symptoms:     Patient Health Questionnaire-9 Score: 11 (1/15/2025  3:40 PM)  BHARAT-7 Total Score: 14 (1/15/2025  3:41 PM)        Interventions Provided: Review Progress/Goals Stress Management, Mindfulness, and dbt/anger      Progress Made: Mixed    Response to Intervention: open and engaging        Plan: follow up in 3 weeks      Follow Up / Next Appointment: Next appointment: 02/19/25

## 2025-01-29 DIAGNOSIS — F43.22 ADJUSTMENT DISORDER WITH ANXIOUS MOOD: ICD-10-CM

## 2025-01-29 RX ORDER — BUPROPION HYDROCHLORIDE 150 MG/1
150 TABLET ORAL EVERY MORNING
Qty: 90 TABLET | Refills: 0 | Status: SHIPPED | OUTPATIENT
Start: 2025-01-29 | End: 2025-04-29

## 2025-01-31 PROCEDURE — 99492 1ST PSYC COLLAB CARE MGMT: CPT | Performed by: FAMILY MEDICINE

## 2025-01-31 PROCEDURE — 99494 1ST/SBSQ PSYC COLLAB CARE: CPT | Performed by: FAMILY MEDICINE

## 2025-02-03 ENCOUNTER — DOCUMENTATION (OUTPATIENT)
Dept: PRIMARY CARE | Facility: CLINIC | Age: 55
End: 2025-02-03
Payer: COMMERCIAL

## 2025-02-03 DIAGNOSIS — F43.22 ADJUSTMENT DISORDER WITH ANXIOUS MOOD: Primary | ICD-10-CM

## 2025-02-07 ENCOUNTER — APPOINTMENT (OUTPATIENT)
Dept: PRIMARY CARE | Facility: CLINIC | Age: 55
End: 2025-02-07
Payer: COMMERCIAL

## 2025-02-14 ENCOUNTER — APPOINTMENT (OUTPATIENT)
Dept: PRIMARY CARE | Facility: CLINIC | Age: 55
End: 2025-02-14
Payer: COMMERCIAL

## 2025-02-14 VITALS
HEIGHT: 70 IN | SYSTOLIC BLOOD PRESSURE: 112 MMHG | OXYGEN SATURATION: 98 % | WEIGHT: 198 LBS | TEMPERATURE: 97.1 F | DIASTOLIC BLOOD PRESSURE: 86 MMHG | HEART RATE: 75 BPM | BODY MASS INDEX: 28.35 KG/M2

## 2025-02-14 DIAGNOSIS — G72.0 STATIN MYOPATHY: ICD-10-CM

## 2025-02-14 DIAGNOSIS — T46.6X5A STATIN MYOPATHY: ICD-10-CM

## 2025-02-14 DIAGNOSIS — E78.5 HYPERLIPIDEMIA, UNSPECIFIED HYPERLIPIDEMIA TYPE: Primary | ICD-10-CM

## 2025-02-14 DIAGNOSIS — F43.22 ADJUSTMENT DISORDER WITH ANXIOUS MOOD: ICD-10-CM

## 2025-02-14 PROCEDURE — 99214 OFFICE O/P EST MOD 30 MIN: CPT | Performed by: FAMILY MEDICINE

## 2025-02-14 PROCEDURE — 3079F DIAST BP 80-89 MM HG: CPT | Performed by: FAMILY MEDICINE

## 2025-02-14 PROCEDURE — 3008F BODY MASS INDEX DOCD: CPT | Performed by: FAMILY MEDICINE

## 2025-02-14 PROCEDURE — 4010F ACE/ARB THERAPY RXD/TAKEN: CPT | Performed by: FAMILY MEDICINE

## 2025-02-14 PROCEDURE — 3074F SYST BP LT 130 MM HG: CPT | Performed by: FAMILY MEDICINE

## 2025-02-14 RX ORDER — BUPROPION HYDROCHLORIDE 150 MG/1
150 TABLET ORAL EVERY MORNING
Qty: 90 TABLET | Refills: 0 | Status: SHIPPED | OUTPATIENT
Start: 2025-02-14 | End: 2025-05-15

## 2025-02-14 ASSESSMENT — ENCOUNTER SYMPTOMS
DIAPHORESIS: 0
DEPRESSION: 0
EYE REDNESS: 0
EYE ITCHING: 0
BACK PAIN: 0
DYSPHORIC MOOD: 0
RESPIRATORY NEGATIVE: 1
NERVOUS/ANXIOUS: 0
CONSTIPATION: 0
APPETITE CHANGE: 0
DIZZINESS: 0
LOSS OF SENSATION IN FEET: 0
CARDIOVASCULAR NEGATIVE: 1
OCCASIONAL FEELINGS OF UNSTEADINESS: 0
ACTIVITY CHANGE: 0
COLOR CHANGE: 0
ANAL BLEEDING: 0
HYPERACTIVE: 0

## 2025-02-14 ASSESSMENT — PATIENT HEALTH QUESTIONNAIRE - PHQ9
SUM OF ALL RESPONSES TO PHQ9 QUESTIONS 1 AND 2: 0
1. LITTLE INTEREST OR PLEASURE IN DOING THINGS: NOT AT ALL
2. FEELING DOWN, DEPRESSED OR HOPELESS: NOT AT ALL
10. IF YOU CHECKED OFF ANY PROBLEMS, HOW DIFFICULT HAVE THESE PROBLEMS MADE IT FOR YOU TO DO YOUR WORK, TAKE CARE OF THINGS AT HOME, OR GET ALONG WITH OTHER PEOPLE: NOT DIFFICULT AT ALL

## 2025-02-14 NOTE — PATIENT INSTRUCTIONS
Follow up 3 months.    Consulting pharmacy staff to see if we could viability obtain other medications and statin to help with high cholesterol.  Patient has been intolerant to statin therapy.    Doing well with the Wellbutrin continue to meet with counseling counseling notes have been reviewed.    Wellbutrin has been renewed    Consulting pharmacy staff for alternative to statin

## 2025-02-14 NOTE — PROGRESS NOTES
"Subjective   Patient ID: Sherwin Saldana is a 54 y.o. male who presents for Follow-up (medications).    Patient is doing very well.  He has been unable to tolerate statin therapy however even with the Pravachol developed muscle aches and discomfort had to come off of it    Doing very well with Wellbutrin has been feeling.  She has wife in [place].    No side effects no nausea no vomiting.  No troubles with numbness or tingling legs or feet.         Review of Systems   Constitutional:  Negative for activity change, appetite change and diaphoresis.   Eyes:  Negative for redness and itching.   Respiratory: Negative.     Cardiovascular: Negative.    Gastrointestinal:  Negative for anal bleeding and constipation.   Musculoskeletal:  Negative for back pain. Myalgias: gone.  Skin:  Negative for color change.   Neurological:  Negative for dizziness.   Psychiatric/Behavioral:  Negative for behavioral problems and dysphoric mood. The patient is not nervous/anxious and is not hyperactive.        Objective   /86   Pulse 75   Temp 36.2 °C (97.1 °F)   Ht 1.778 m (5' 10\")   Wt 89.8 kg (198 lb)   SpO2 98%   BMI 28.41 kg/m²   BSA Body surface area is 2.11 meters squared.      Physical Exam  Constitutional:       Appearance: Normal appearance.   HENT:      Right Ear: Tympanic membrane normal.      Left Ear: Tympanic membrane normal.   Eyes:      Extraocular Movements: Extraocular movements intact.      Pupils: Pupils are equal, round, and reactive to light.   Neck:      Vascular: No carotid bruit.   Cardiovascular:      Rate and Rhythm: Normal rate and regular rhythm.   Pulmonary:      Effort: Pulmonary effort is normal.   Abdominal:      General: Abdomen is flat.   Neurological:      Mental Status: He is alert.   Psychiatric:         Mood and Affect: Mood normal.         Behavior: Behavior normal.         Thought Content: Thought content normal.         Judgment: Judgment normal.       Lab on 02/15/2024   Component Date " Value Ref Range Status    Glucose 02/15/2024 139 (H)  74 - 99 mg/dL Final    Sodium 02/15/2024 142  136 - 145 mmol/L Final    Potassium 02/15/2024 4.6  3.5 - 5.3 mmol/L Final    Chloride 02/15/2024 102  98 - 107 mmol/L Final    Bicarbonate 02/15/2024 27  21 - 32 mmol/L Final    Anion Gap 02/15/2024 18  10 - 20 mmol/L Final    Urea Nitrogen 02/15/2024 12  6 - 23 mg/dL Final    Creatinine 02/15/2024 0.90  0.50 - 1.30 mg/dL Final    eGFR 02/15/2024 >90  >60 mL/min/1.73m*2 Final    Calculations of estimated GFR are performed using the 2021 CKD-EPI Study Refit equation without the race variable for the IDMS-Traceable creatinine methods.  https://jasn.asnjournals.org/content/early/2021/09/22/ASN.8905919190    Calcium 02/15/2024 10.2  8.6 - 10.6 mg/dL Final    Albumin 02/15/2024 4.8  3.4 - 5.0 g/dL Final    Alkaline Phosphatase 02/15/2024 46  33 - 120 U/L Final    Total Protein 02/15/2024 7.1  6.4 - 8.2 g/dL Final    AST 02/15/2024 26  9 - 39 U/L Final    Bilirubin, Total 02/15/2024 0.5  0.0 - 1.2 mg/dL Final    ALT 02/15/2024 30  10 - 52 U/L Final    Patients treated with Sulfasalazine may generate falsely decreased results for ALT.    Hemoglobin A1C 02/15/2024 6.6 (H)  see below % Final    Estimated Average Glucose 02/15/2024 143  Not Established mg/dL Final    Cholesterol 02/15/2024 261 (H)  0 - 199 mg/dL Final          Age      Desirable   Borderline High   High     0-19 Y     0 - 169       170 - 199     >/= 200    20-24 Y     0 - 189       190 - 224     >/= 225         >24 Y     0 - 199       200 - 239     >/= 240   **All ranges are based on fasting samples. Specific   therapeutic targets will vary based on patient-specific   cardiac risk.    Pediatric guidelines reference:Pediatrics 2011, 128(S5).Adult guidelines reference: NCEP ATPIII Guidelines,KURT 2001, 258:2486-97    Venipuncture immediately after or during the administration of Metamizole may lead to falsely low results. Testing should be performed immediately  prior to Metamizole dosing.    HDL-Cholesterol 02/15/2024 47.3  mg/dL Final      Age       Very Low   Low     Normal    High    0-19 Y    < 35      < 40     40-45     ----  20-24 Y    ----     < 40      >45      ----        >24 Y      ----     < 40     40-60      >60      Cholesterol/HDL Ratio 02/15/2024 5.5   Final      Ref Values  Desirable  < 3.4  High Risk  > 5.0    LDL Calculated 02/15/2024 177 (H)  <=99 mg/dL Final                                Near   Borderline      AGE      Desirable  Optimal    High     High     Very High     0-19 Y     0 - 109     ---    110-129   >/= 130     ----    20-24 Y     0 - 119     ---    120-159   >/= 160     ----      >24 Y     0 -  99   100-129  130-159   160-189     >/=190      VLDL 02/15/2024 37  0 - 40 mg/dL Final    Triglycerides 02/15/2024 184 (H)  0 - 149 mg/dL Final       Age         Desirable   Borderline High   High     Very High   0 D-90 D    19 - 174         ----         ----        ----  91 D- 9 Y     0 -  74        75 -  99     >/= 100      ----    10-19 Y     0 -  89        90 - 129     >/= 130      ----    20-24 Y     0 - 114       115 - 149     >/= 150      ----         >24 Y     0 - 149       150 - 199    200- 499    >/= 500    Venipuncture immediately after or during the administration of Metamizole may lead to falsely low results. Testing should be performed immediately prior to Metamizole dosing.    Non HDL Cholesterol 02/15/2024 214 (H)  0 - 149 mg/dL Final          Age       Desirable   Borderline High   High     Very High     0-19 Y     0 - 119       120 - 144     >/= 145    >/= 160    20-24 Y     0 - 149       150 - 189     >/= 190      ----         >24 Y    30 mg/dL above LDL Cholesterol goal      Sedimentation Rate 02/15/2024 3  0 - 20 mm/h Final    C-Reactive Protein 02/15/2024 <0.10  <1.00 mg/dL Final     Current Outpatient Medications on File Prior to Visit   Medication Sig Dispense Refill    buPROPion XL (Wellbutrin XL) 150 mg 24 hr tablet Take 1  tablet (150 mg) by mouth once daily in the morning. Do not crush, chew, or split. 90 tablet 0    co-enzyme Q-10 30 mg capsule Take 1 capsule (30 mg) by mouth once daily.      losartan (Cozaar) 100 mg tablet TAKE 1 TABLET BY MOUTH EVERY DAY 90 tablet 2    metFORMIN (Glucophage) 500 mg tablet TAKE 1 TABLET BY MOUTH EVERY DAY 90 tablet 1    pravastatin (Pravachol) 10 mg tablet TAKE 1 TABLET BY MOUTH EVERY DAY 90 tablet 1     No current facility-administered medications on file prior to visit.     No images are attached to the encounter.            Assessment/Plan   Problem List Items Addressed This Visit             ICD-10-CM    Hyperlipemia - Primary E78.5    Relevant Orders    Referral to Clinical Pharmacy    Adjustment disorder with anxious mood F43.22     Doing well with [place] ing  and wellbutrin         Relevant Medications    buPROPion XL (Wellbutrin XL) 150 mg 24 hr tablet    Statin myopathy G72.0, [id number]     Consult pharmacy

## 2025-02-19 ENCOUNTER — APPOINTMENT (OUTPATIENT)
Dept: PRIMARY CARE | Facility: CLINIC | Age: 55
End: 2025-02-19
Payer: COMMERCIAL

## 2025-02-20 ENCOUNTER — APPOINTMENT (OUTPATIENT)
Dept: PRIMARY CARE | Facility: CLINIC | Age: 55
End: 2025-02-20
Payer: COMMERCIAL

## 2025-02-20 NOTE — PROGRESS NOTES
Collaborative Care (CoCM)  Progress Note    Type of Interaction: In Office    Start Time: 9:04 am    End Time: 10:04 am        Appointment: Scheduled    Reason for Visit:   Chief Complaint   Patient presents with    adjustment disorder with anxious mood    Meeting with patient for follow up in office  Went to visit wife and it was a good visit and felt reconnected; wife is here now and will be until march 1st.  Wellbutrin seems to be working for patient; mood has improved  Everything at home has been going well including the kids and their schedules.  Discussed losing his crutch and having to lean into other skills this morning and was successful  Discussed being able to respond vs react when it comes to feelings of anger and other coping options if patient does have a moment when he slips up.   Also encouraged patient to think about what it would mean if he has a slip up and where his thoughts go.           Interval History / Patient Symptoms:     No data recorded      Interventions Provided: Develop Coping Strategies, Review Progress/Goals Stress Management, Mindfulness, and anger, support      Progress Made: Moderate    Response to Intervention: open and engaging        Plan: follow up in 3 weeks      Follow Up / Next Appointment: Next appointment: 03/13/25

## 2025-02-28 PROCEDURE — 99493 SBSQ PSYC COLLAB CARE MGMT: CPT | Performed by: FAMILY MEDICINE

## 2025-03-03 ENCOUNTER — DOCUMENTATION (OUTPATIENT)
Dept: PRIMARY CARE | Facility: CLINIC | Age: 55
End: 2025-03-03
Payer: COMMERCIAL

## 2025-03-03 DIAGNOSIS — F43.22 ADJUSTMENT DISORDER WITH ANXIOUS MOOD: Primary | ICD-10-CM

## 2025-03-04 ENCOUNTER — APPOINTMENT (OUTPATIENT)
Dept: PHARMACY | Facility: HOSPITAL | Age: 55
End: 2025-03-04
Payer: COMMERCIAL

## 2025-03-04 ENCOUNTER — SPECIALTY PHARMACY (OUTPATIENT)
Dept: PHARMACY | Facility: CLINIC | Age: 55
End: 2025-03-04

## 2025-03-04 DIAGNOSIS — E78.5 HYPERLIPIDEMIA, UNSPECIFIED HYPERLIPIDEMIA TYPE: ICD-10-CM

## 2025-03-04 RX ORDER — EVOLOCUMAB 140 MG/ML
140 INJECTION, SOLUTION SUBCUTANEOUS
Qty: 2 ML | Refills: 11 | Status: SHIPPED | OUTPATIENT
Start: 2025-03-04

## 2025-03-04 NOTE — PROGRESS NOTES
Subjective   Patient ID: Sherwin Saldana is a 54 y.o. male who presents for Hyperlipidemia.    Referring Provider: DO LEANDRA Conde  HYPERLIPIDEMIA ASSESSMENT  Primary prevention; The 10-year ASCVD risk score (Reina FERREIRA, et al., 2019) is: 10.9%    Values used to calculate the score:      Age: 54 years      Sex: Male      Is Non- : No      Diabetic: Yes      Tobacco smoker: No      Systolic Blood Pressure: 112 mmHg      Is BP treated: Yes      HDL Cholesterol: 47.3 mg/dL      Total Cholesterol: 261 mg/dL     LDL Goal: < 70    Medication Therapy  Current medications include:  None currently  Clarifications to above regimen: n/a  Adverse Effects: myalgia with hydrophilic statin therapy  Previous Medications: pravastatin April-October 2024, rosuvastatin     Additional Considerations:  Immunizations Needed: Annual Flu and Shingrix  Tobacco Use: former smoker, quit 2020     Review of Systems    Medication System Management:  Affordability/Accessibility: Repatha requires PA.   Adherence/Organization: Good  Adverse Effects: Myalgia with multiple doses of statin therapy; > 2 weeks duration   Pravastatin 10 mg April to October 2024   Rosuvastatin 20 mg January 2023   Atorvastatin is not indicated due to failing multiple hydrophilic statins   No plans to trial Ezetimibe due to concerns regarding polypharmacy and lack of total benefit    Objective     There were no vitals taken for this visit.     Labs  Lab Results   Component Value Date    BILITOT 0.5 02/15/2024    CALCIUM 10.2 02/15/2024    CO2 27 02/15/2024     02/15/2024    CREATININE 0.90 02/15/2024    GLUCOSE 139 (H) 02/15/2024    ALKPHOS 46 02/15/2024    K 4.6 02/15/2024    PROT 7.1 02/15/2024     02/15/2024    AST 26 02/15/2024    ALT 30 02/15/2024    BUN 12 02/15/2024    ANIONGAP 18 02/15/2024    ALBUMIN 4.8 02/15/2024    GFRMALE 83 09/13/2023     Lab Results   Component Value Date    TRIG 184 (H) 02/15/2024    CHOL 261 (H)  02/15/2024    LDLCALC 177 (H) 02/15/2024    HDL 47.3 02/15/2024     Lab Results   Component Value Date    HGBA1C 6.6 (H) 02/15/2024       Current Outpatient Medications on File Prior to Visit   Medication Sig Dispense Refill    buPROPion XL (Wellbutrin XL) 150 mg 24 hr tablet Take 1 tablet (150 mg) by mouth once daily in the morning. Do not crush, chew, or split. 90 tablet 0    losartan (Cozaar) 100 mg tablet TAKE 1 TABLET BY MOUTH EVERY DAY 90 tablet 2    metFORMIN (Glucophage) 500 mg tablet TAKE 1 TABLET BY MOUTH EVERY DAY 90 tablet 1     No current facility-administered medications on file prior to visit.        Assessment/Plan   Problem List Items Addressed This Visit       Hyperlipemia    Relevant Medications    evolocumab (Repatha SureClick) 140 mg/mL injection    Other Relevant Orders    Referral to Clinical Pharmacy   uncontrolled with last clinic  and     LFTs are WNL   Lab Results   Component Value Date    ALT 30 02/15/2024    AST 26 02/15/2024    ALKPHOS 46 02/15/2024    BILITOT 0.5 02/15/2024        Medication Changes:  CONTINUE  All other meds  START  Repatha 140 mg every 2 weeks injection; pending PA    Education and Monitoring:  Injection technique, side effect monitoring, prospective benefit and LDL goals < 70  Focus on implementing dietary changes as discussed.  Recheck lipid panel in 4-12 weeks.  Follow up 4/1/25 @ 1400      Chip Chau, PharmD    Continue all meds under the continuation of care with the referring provider and clinical pharmacy team.

## 2025-03-12 ENCOUNTER — SPECIALTY PHARMACY (OUTPATIENT)
Dept: PHARMACY | Facility: CLINIC | Age: 55
End: 2025-03-12

## 2025-03-13 ENCOUNTER — APPOINTMENT (OUTPATIENT)
Dept: PRIMARY CARE | Facility: CLINIC | Age: 55
End: 2025-03-13
Payer: COMMERCIAL

## 2025-03-13 ASSESSMENT — PATIENT HEALTH QUESTIONNAIRE - PHQ9
SUM OF ALL RESPONSES TO PHQ9 QUESTIONS 1 & 2: 2
SUM OF ALL RESPONSES TO PHQ QUESTIONS 1-9: 5
6. FEELING BAD ABOUT YOURSELF - OR THAT YOU ARE A FAILURE OR HAVE LET YOURSELF OR YOUR FAMILY DOWN: NOT AT ALL
8. MOVING OR SPEAKING SO SLOWLY THAT OTHER PEOPLE COULD HAVE NOTICED. OR THE OPPOSITE, BEING SO FIGETY OR RESTLESS THAT YOU HAVE BEEN MOVING AROUND A LOT MORE THAN USUAL: NOT AT ALL
1. LITTLE INTEREST OR PLEASURE IN DOING THINGS: SEVERAL DAYS
9. THOUGHTS THAT YOU WOULD BE BETTER OFF DEAD, OR OF HURTING YOURSELF: NOT AT ALL
7. TROUBLE CONCENTRATING ON THINGS, SUCH AS READING THE NEWSPAPER OR WATCHING TELEVISION: SEVERAL DAYS
5. POOR APPETITE OR OVEREATING: NOT AT ALL
4. FEELING TIRED OR HAVING LITTLE ENERGY: NOT AT ALL
2. FEELING DOWN, DEPRESSED OR HOPELESS: SEVERAL DAYS
3. TROUBLE FALLING OR STAYING ASLEEP: MORE THAN HALF THE DAYS
10. IF YOU CHECKED OFF ANY PROBLEMS, HOW DIFFICULT HAVE THESE PROBLEMS MADE IT FOR YOU TO DO YOUR WORK, TAKE CARE OF THINGS AT HOME, OR GET ALONG WITH OTHER PEOPLE: VERY DIFFICULT

## 2025-03-13 ASSESSMENT — ANXIETY QUESTIONNAIRES
2. NOT BEING ABLE TO STOP OR CONTROL WORRYING: SEVERAL DAYS
3. WORRYING TOO MUCH ABOUT DIFFERENT THINGS: SEVERAL DAYS
6. BECOMING EASILY ANNOYED OR IRRITABLE: SEVERAL DAYS
4. TROUBLE RELAXING: NEARLY EVERY DAY
GAD7 TOTAL SCORE: 7
7. FEELING AFRAID AS IF SOMETHING AWFUL MIGHT HAPPEN: NOT AT ALL
IF YOU CHECKED OFF ANY PROBLEMS ON THIS QUESTIONNAIRE, HOW DIFFICULT HAVE THESE PROBLEMS MADE IT FOR YOU TO DO YOUR WORK, TAKE CARE OF THINGS AT HOME, OR GET ALONG WITH OTHER PEOPLE: SOMEWHAT DIFFICULT
1. FEELING NERVOUS, ANXIOUS, OR ON EDGE: SEVERAL DAYS
5. BEING SO RESTLESS THAT IT IS HARD TO SIT STILL: NOT AT ALL

## 2025-03-13 NOTE — PROGRESS NOTES
Collaborative Care (CoCM)  Progress Note    Type of Interaction: In Office    Start Time: 9:05 am    End Time: 10:00 am        Appointment: Scheduled    Reason for Visit:   Chief Complaint   Patient presents with    adjustment disorder with anxious mood    Meeting with patient for follow up in office.  Past 5 days has been bad and patient processed and discussed these instances.   Had to drive to [place] for taxes, was feeling frustrated after the meeting but also feeling anxious as to not having control and then found out what they owe, which was not as bad as previously thought but still a lot of money.   Busy weekend with running around and then took down the fence in the front yard  Discussed bad mood Jc night due to insurance call at night  Account hacked and discussed having certain emotions surrounding this. At times feels frustrated that he is unable to vent without being judged.   Self care, emotional regulation, taking everything one day at time and patient continues to respond instead of react to situations.       Interval History / Patient Symptoms:     Patient Health Questionnaire-9 Score: 5 (3/13/2025  1:58 PM)  BHARAT-7 Total Score: 7 (3/13/2025  2:02 PM)        Interventions Provided: Review Progress/Goals Stress Management, Mindfulness, and cbt, support      Progress Made: Mixed    Response to Intervention: open and engaging        Plan: follow up in 3 weeks      Follow Up / Next Appointment: Next appointment: 04/03/25

## 2025-03-28 ENCOUNTER — DOCUMENTATION (OUTPATIENT)
Dept: PRIMARY CARE | Facility: CLINIC | Age: 55
End: 2025-03-28
Payer: COMMERCIAL

## 2025-03-28 DIAGNOSIS — F43.22 ADJUSTMENT DISORDER WITH ANXIOUS MOOD: Primary | ICD-10-CM

## 2025-04-01 ENCOUNTER — APPOINTMENT (OUTPATIENT)
Dept: PHARMACY | Facility: HOSPITAL | Age: 55
End: 2025-04-01
Payer: COMMERCIAL

## 2025-04-03 ENCOUNTER — APPOINTMENT (OUTPATIENT)
Dept: PRIMARY CARE | Facility: CLINIC | Age: 55
End: 2025-04-03
Payer: COMMERCIAL

## 2025-04-03 NOTE — PROGRESS NOTES
Collaborative Care (CoCM)  Progress Note    Type of Interaction: In Office    Start Time: 9:04 am    End Time: 9:55 am        Appointment: Scheduled    Reason for Visit:   Chief Complaint   Patient presents with    adjustment disorder with anxious mood    Meeting with patient for follow up appointment  Had to get a new furnace   Going to visit his wife this week- wants to try to make sure her home feel like its their home when he eventually moves out there in a few years. Even discussed getting something bigger instead of staying in the apartment.  Discussed wife's medical issues and how she is having a hard time- did give her some room and space to process and have her emotions. Wishes he was there to comfort her when she got her news.   Discussed compassion  Reduction in overwhelming anger and anxiety      Interval History / Patient Symptoms:     No data recorded      Interventions Provided: Review Progress/Goals Stress Management, Mindfulness, and cbt, support      Progress Made: Significant    Response to Intervention: open and engaging        Plan: follow up in 1 month      Follow Up / Next Appointment: Next appointment: 05/01/25

## 2025-05-01 ENCOUNTER — APPOINTMENT (OUTPATIENT)
Dept: PRIMARY CARE | Facility: CLINIC | Age: 55
End: 2025-05-01
Payer: COMMERCIAL

## 2025-05-01 NOTE — PROGRESS NOTES
Collaborative Care (CoCM)  Progress Note    Type of Interaction: In Office    Start Time: 9:07 am    End Time: 10:03 am        Appointment: Scheduled    Reason for Visit:   Chief Complaint   Patient presents with    adjustment disorder with anxious mood    Meeting with patient for follow up in office  Discussed new tool to take of the lawn and is excited for the nice weather to use the patio  Discussed trying to go to [place] over the summer; wife will be coming into town in the next week  Discussed emotional regulation in regards to work and how he handled a stressful situation  Had to put dog down; processed those emotions  Discussed working on compassion and how he was of assistance to another AA member who was frustrated with some technology problems.             Interval History / Patient Symptoms:     No data recorded      Interventions Provided: Review Progress/Goals Stress Management and emotional regulation, suppport.      Progress Made: Moderate    Response to Intervention: open and engaging         Plan: follow up in 1 month         Follow Up / Next Appointment: Next appointment: 06/04/25

## 2025-05-02 ENCOUNTER — DOCUMENTATION (OUTPATIENT)
Dept: PRIMARY CARE | Facility: CLINIC | Age: 55
End: 2025-05-02
Payer: COMMERCIAL

## 2025-05-02 DIAGNOSIS — F43.22 ADJUSTMENT DISORDER WITH ANXIOUS MOOD: Primary | ICD-10-CM

## 2025-05-25 DIAGNOSIS — E11.43 TYPE 2 DIABETES MELLITUS WITH DIABETIC AUTONOMIC (POLY)NEUROPATHY: ICD-10-CM

## 2025-05-25 DIAGNOSIS — I10 ESSENTIAL (PRIMARY) HYPERTENSION: ICD-10-CM

## 2025-05-27 RX ORDER — LOSARTAN POTASSIUM 100 MG/1
100 TABLET ORAL DAILY
Qty: 90 TABLET | Refills: 2 | Status: SHIPPED | OUTPATIENT
Start: 2025-05-27

## 2025-05-27 RX ORDER — METFORMIN HYDROCHLORIDE 500 MG/1
500 TABLET ORAL DAILY
Qty: 90 TABLET | Refills: 1 | Status: SHIPPED | OUTPATIENT
Start: 2025-05-27

## 2025-05-30 ENCOUNTER — DOCUMENTATION (OUTPATIENT)
Dept: PRIMARY CARE | Facility: CLINIC | Age: 55
End: 2025-05-30
Payer: COMMERCIAL

## 2025-05-30 DIAGNOSIS — F43.22 ADJUSTMENT DISORDER WITH ANXIOUS MOOD: Primary | ICD-10-CM

## 2025-06-04 ENCOUNTER — APPOINTMENT (OUTPATIENT)
Dept: PRIMARY CARE | Facility: CLINIC | Age: 55
End: 2025-06-04
Payer: COMMERCIAL

## 2025-07-25 DIAGNOSIS — F43.22 ADJUSTMENT DISORDER WITH ANXIOUS MOOD: ICD-10-CM

## 2025-07-25 RX ORDER — BUPROPION HYDROCHLORIDE 150 MG/1
150 TABLET ORAL EVERY MORNING
Qty: 90 TABLET | Refills: 0 | Status: SHIPPED | OUTPATIENT
Start: 2025-07-25 | End: 2025-10-23

## 2025-08-20 ENCOUNTER — APPOINTMENT (OUTPATIENT)
Dept: PRIMARY CARE | Facility: CLINIC | Age: 55
End: 2025-08-20
Payer: COMMERCIAL

## 2025-08-20 ASSESSMENT — PATIENT HEALTH QUESTIONNAIRE - PHQ9
SUM OF ALL RESPONSES TO PHQ QUESTIONS 1-9: 4
9. THOUGHTS THAT YOU WOULD BE BETTER OFF DEAD, OR OF HURTING YOURSELF: NOT AT ALL
5. POOR APPETITE OR OVEREATING: SEVERAL DAYS
7. TROUBLE CONCENTRATING ON THINGS, SUCH AS READING THE NEWSPAPER OR WATCHING TELEVISION: NOT AT ALL
SUM OF ALL RESPONSES TO PHQ9 QUESTIONS 1 & 2: 2
6. FEELING BAD ABOUT YOURSELF - OR THAT YOU ARE A FAILURE OR HAVE LET YOURSELF OR YOUR FAMILY DOWN: NOT AT ALL
3. TROUBLE FALLING OR STAYING ASLEEP: SEVERAL DAYS
4. FEELING TIRED OR HAVING LITTLE ENERGY: NOT AT ALL
2. FEELING DOWN, DEPRESSED OR HOPELESS: SEVERAL DAYS
1. LITTLE INTEREST OR PLEASURE IN DOING THINGS: SEVERAL DAYS
8. MOVING OR SPEAKING SO SLOWLY THAT OTHER PEOPLE COULD HAVE NOTICED. OR THE OPPOSITE, BEING SO FIGETY OR RESTLESS THAT YOU HAVE BEEN MOVING AROUND A LOT MORE THAN USUAL: NOT AT ALL
10. IF YOU CHECKED OFF ANY PROBLEMS, HOW DIFFICULT HAVE THESE PROBLEMS MADE IT FOR YOU TO DO YOUR WORK, TAKE CARE OF THINGS AT HOME, OR GET ALONG WITH OTHER PEOPLE: SOMEWHAT DIFFICULT

## 2025-08-20 ASSESSMENT — ANXIETY QUESTIONNAIRES
IF YOU CHECKED OFF ANY PROBLEMS ON THIS QUESTIONNAIRE, HOW DIFFICULT HAVE THESE PROBLEMS MADE IT FOR YOU TO DO YOUR WORK, TAKE CARE OF THINGS AT HOME, OR GET ALONG WITH OTHER PEOPLE: SOMEWHAT DIFFICULT
3. WORRYING TOO MUCH ABOUT DIFFERENT THINGS: SEVERAL DAYS
7. FEELING AFRAID AS IF SOMETHING AWFUL MIGHT HAPPEN: SEVERAL DAYS
6. BECOMING EASILY ANNOYED OR IRRITABLE: NOT AT ALL
5. BEING SO RESTLESS THAT IT IS HARD TO SIT STILL: NOT AT ALL
1. FEELING NERVOUS, ANXIOUS, OR ON EDGE: SEVERAL DAYS
4. TROUBLE RELAXING: SEVERAL DAYS
2. NOT BEING ABLE TO STOP OR CONTROL WORRYING: NOT AT ALL
GAD7 TOTAL SCORE: 4

## 2025-08-29 ENCOUNTER — DOCUMENTATION (OUTPATIENT)
Dept: PRIMARY CARE | Facility: CLINIC | Age: 55
End: 2025-08-29
Payer: COMMERCIAL

## 2025-08-29 DIAGNOSIS — F43.22 ADJUSTMENT DISORDER WITH ANXIOUS MOOD: Primary | ICD-10-CM
